# Patient Record
Sex: FEMALE | Race: WHITE | NOT HISPANIC OR LATINO | Employment: UNEMPLOYED | URBAN - METROPOLITAN AREA
[De-identification: names, ages, dates, MRNs, and addresses within clinical notes are randomized per-mention and may not be internally consistent; named-entity substitution may affect disease eponyms.]

---

## 2013-12-17 LAB — HM MAMMOGRAM: NORMAL

## 2015-02-04 LAB — HM PAP SMEAR: NEGATIVE

## 2017-01-03 DIAGNOSIS — F51.01 PRIMARY INSOMNIA: ICD-10-CM

## 2017-01-04 RX ORDER — ZOLPIDEM TARTRATE 10 MG/1
TABLET ORAL
Qty: 30 TABLET | Refills: 0 | OUTPATIENT
Start: 2017-01-04

## 2017-01-31 DIAGNOSIS — F51.01 PRIMARY INSOMNIA: ICD-10-CM

## 2017-01-31 RX ORDER — ZOLPIDEM TARTRATE 10 MG/1
TABLET ORAL
Qty: 15 TABLET | Refills: 0 | OUTPATIENT
Start: 2017-01-31 | End: 2017-02-27 | Stop reason: SDUPTHER

## 2017-02-19 DIAGNOSIS — F51.01 PRIMARY INSOMNIA: ICD-10-CM

## 2017-02-20 RX ORDER — ZOLPIDEM TARTRATE 10 MG/1
TABLET ORAL
Qty: 15 TABLET | Refills: 0 | OUTPATIENT
Start: 2017-02-20

## 2017-02-23 DIAGNOSIS — F51.01 PRIMARY INSOMNIA: ICD-10-CM

## 2017-02-23 RX ORDER — ZOLPIDEM TARTRATE 10 MG/1
TABLET ORAL
Qty: 15 TABLET | Refills: 0 | OUTPATIENT
Start: 2017-02-23

## 2017-02-27 DIAGNOSIS — F51.01 PRIMARY INSOMNIA: ICD-10-CM

## 2017-02-27 RX ORDER — ZOLPIDEM TARTRATE 10 MG/1
10 TABLET ORAL NIGHTLY PRN
Qty: 7 TABLET | Refills: 0 | OUTPATIENT
Start: 2017-02-27 | End: 2017-05-02 | Stop reason: SDUPTHER

## 2017-02-27 NOTE — TELEPHONE ENCOUNTER
Pt called wanting to know if we can refill her sleeping medication until her appointment? Medardo:03/09/17    Last seen 5/2016  Last refill 1/31/17 #15

## 2017-03-06 ENCOUNTER — OFFICE VISIT (OUTPATIENT)
Dept: OBSTETRICS AND GYNECOLOGY | Facility: CLINIC | Age: 47
End: 2017-03-06

## 2017-03-06 VITALS
WEIGHT: 140 LBS | BODY MASS INDEX: 25.76 KG/M2 | HEIGHT: 62 IN | DIASTOLIC BLOOD PRESSURE: 62 MMHG | SYSTOLIC BLOOD PRESSURE: 110 MMHG

## 2017-03-06 DIAGNOSIS — Z13.9 SCREENING: ICD-10-CM

## 2017-03-06 DIAGNOSIS — Z12.4 SCREENING FOR MALIGNANT NEOPLASM OF CERVIX: ICD-10-CM

## 2017-03-06 DIAGNOSIS — Z12.39 BREAST CANCER SCREENING: ICD-10-CM

## 2017-03-06 DIAGNOSIS — Z80.0 FAMILY HISTORY OF COLON CANCER REQUIRING SCREENING COLONOSCOPY: ICD-10-CM

## 2017-03-06 DIAGNOSIS — Z86.010 HISTORY OF COLON POLYPS: ICD-10-CM

## 2017-03-06 DIAGNOSIS — Z01.419 ENCOUNTER FOR GYNECOLOGICAL EXAMINATION WITHOUT ABNORMAL FINDING: Primary | ICD-10-CM

## 2017-03-06 LAB
BILIRUB BLD-MCNC: NEGATIVE MG/DL
CLARITY, POC: CLEAR
COLOR UR: YELLOW
GLUCOSE UR STRIP-MCNC: NEGATIVE MG/DL
KETONES UR QL: NEGATIVE
LEUKOCYTE EST, POC: NEGATIVE
NITRITE UR-MCNC: NEGATIVE MG/ML
PH UR: 6.5 [PH] (ref 5–8)
PROT UR STRIP-MCNC: NEGATIVE MG/DL
RBC # UR STRIP: NEGATIVE /UL
SP GR UR: 1.01 (ref 1–1.03)
UROBILINOGEN UR QL: NORMAL

## 2017-03-06 PROCEDURE — 81002 URINALYSIS NONAUTO W/O SCOPE: CPT | Performed by: OBSTETRICS & GYNECOLOGY

## 2017-03-06 PROCEDURE — 99396 PREV VISIT EST AGE 40-64: CPT | Performed by: OBSTETRICS & GYNECOLOGY

## 2017-03-06 NOTE — PROGRESS NOTES
"Subjective   Eimly Tierney is a 46 y.o. female who presents for annual - h/o colon polyps at colonoscopy 6 years ago - due for repeat - requesting 3-d tomosynthesis but advised insurance may not cover -  Reports  Periods are regular and last 5 days and are often heavy and painful for first 48 hours - does not require intervention at this time       History of Present Illness    The following portions of the patient's history were reviewed and updated as appropriate: allergies, current medications, past family history, past medical history, past social history, past surgical history and problem list.    Review of Systems   Constitutional: Negative for chills, fatigue and fever.   Gastrointestinal: Negative for abdominal distention, abdominal pain and blood in stool.   Genitourinary: Negative for dyspareunia, dysuria, menstrual problem, pelvic pain, vaginal bleeding, vaginal discharge and vaginal pain.   All other systems reviewed and are negative.    Visit Vitals   • /62   • Ht 62\" (157.5 cm)   • Wt 140 lb (63.5 kg)   • LMP 02/13/2017   • Breastfeeding No   • BMI 25.61 kg/m2       Objective   Physical Exam   Constitutional: She is oriented to person, place, and time. She appears well-developed and well-nourished.   Neck: Normal range of motion. Neck supple. No thyromegaly present.   Cardiovascular: Normal rate and regular rhythm.    Pulmonary/Chest: Effort normal and breath sounds normal. Right breast exhibits no mass, no nipple discharge, no skin change and no tenderness. Left breast exhibits no mass, no nipple discharge, no skin change and no tenderness.   Abdominal: Soft. Bowel sounds are normal. She exhibits no distension. There is no tenderness.   Genitourinary: Vagina normal and uterus normal. Pelvic exam was performed with patient supine. Uterus is not tender. Cervix exhibits no friability. Right adnexum displays no mass and no tenderness. Left adnexum displays no mass and no tenderness. No " vaginal discharge found.   Musculoskeletal: Normal range of motion. She exhibits no edema.   Neurological: She is alert and oriented to person, place, and time.   Skin: Skin is warm and dry. No rash noted.   Psychiatric: She has a normal mood and affect. Her behavior is normal.   Nursing note and vitals reviewed.        Assessment/Plan   Emily was seen today for establish care, gynecologic exam and menstrual problem.    Diagnoses and all orders for this visit:    Encounter for gynecological examination without abnormal finding    Screening  -     POC Urinalysis Dipstick    Screening for malignant neoplasm of cervix  -     IgP, Aptima HPV    Breast cancer screening  -     Cancel: Mammo screening digital tomosynthesis bilateral w CAD; Future  -     Mammo Screening Bilateral With CAD; Future    History of colon polyps  -     Ambulatory Referral For Screening Colonoscopy    Family history of colon cancer requiring screening colonoscopy  -     Ambulatory Referral For Screening Colonoscopy

## 2017-03-08 LAB
CYTOLOGIST CVX/VAG CYTO: NORMAL
CYTOLOGY CVX/VAG DOC THIN PREP: NORMAL
DX ICD CODE: NORMAL
HIV 1 & 2 AB SER-IMP: NORMAL
HPV I/H RISK 4 DNA CVX QL PROBE+SIG AMP: NEGATIVE
OTHER STN SPEC: NORMAL
PATH REPORT.FINAL DX SPEC: NORMAL
STAT OF ADQ CVX/VAG CYTO-IMP: NORMAL

## 2017-03-09 ENCOUNTER — OFFICE VISIT (OUTPATIENT)
Dept: FAMILY MEDICINE CLINIC | Facility: CLINIC | Age: 47
End: 2017-03-09

## 2017-03-09 ENCOUNTER — TELEPHONE (OUTPATIENT)
Dept: OBSTETRICS AND GYNECOLOGY | Facility: CLINIC | Age: 47
End: 2017-03-09

## 2017-03-09 VITALS
BODY MASS INDEX: 25.58 KG/M2 | WEIGHT: 139 LBS | TEMPERATURE: 98.3 F | SYSTOLIC BLOOD PRESSURE: 105 MMHG | HEIGHT: 62 IN | DIASTOLIC BLOOD PRESSURE: 60 MMHG | OXYGEN SATURATION: 99 % | HEART RATE: 73 BPM

## 2017-03-09 DIAGNOSIS — Z00.00 ANNUAL PHYSICAL EXAM: Primary | ICD-10-CM

## 2017-03-09 DIAGNOSIS — I10 BENIGN ESSENTIAL HYPERTENSION: ICD-10-CM

## 2017-03-09 LAB
ALBUMIN SERPL-MCNC: 4.5 G/DL (ref 3.5–5.2)
ALBUMIN/GLOB SERPL: 1.9 G/DL
ALP SERPL-CCNC: 34 U/L (ref 39–117)
ALT SERPL-CCNC: 14 U/L (ref 1–33)
APPEARANCE UR: CLEAR
AST SERPL-CCNC: 16 U/L (ref 1–32)
BASOPHILS # BLD AUTO: 0.02 10*3/MM3 (ref 0–0.2)
BASOPHILS NFR BLD AUTO: 0.4 % (ref 0–1.5)
BILIRUB SERPL-MCNC: 0.3 MG/DL (ref 0.1–1.2)
BILIRUB UR QL STRIP: NEGATIVE
BUN SERPL-MCNC: 13 MG/DL (ref 6–20)
BUN/CREAT SERPL: 16.9 (ref 7–25)
CALCIUM SERPL-MCNC: 9.3 MG/DL (ref 8.6–10.5)
CHLORIDE SERPL-SCNC: 99 MMOL/L (ref 98–107)
CHOLEST SERPL-MCNC: 231 MG/DL (ref 0–200)
CO2 SERPL-SCNC: 24.5 MMOL/L (ref 22–29)
COLOR UR: YELLOW
CREAT SERPL-MCNC: 0.77 MG/DL (ref 0.57–1)
EOSINOPHIL # BLD AUTO: 0.11 10*3/MM3 (ref 0–0.7)
EOSINOPHIL NFR BLD AUTO: 2.3 % (ref 0.3–6.2)
ERYTHROCYTE [DISTWIDTH] IN BLOOD BY AUTOMATED COUNT: 13 % (ref 11.7–13)
GLOBULIN SER CALC-MCNC: 2.4 GM/DL
GLUCOSE SERPL-MCNC: 86 MG/DL (ref 65–99)
GLUCOSE UR QL: NEGATIVE
HCT VFR BLD AUTO: 39 % (ref 35.6–45.5)
HDLC SERPL-MCNC: 91 MG/DL (ref 40–60)
HGB BLD-MCNC: 12.6 G/DL (ref 11.9–15.5)
HGB UR QL STRIP: NEGATIVE
IMM GRANULOCYTES # BLD: 0 10*3/MM3 (ref 0–0.03)
IMM GRANULOCYTES NFR BLD: 0 % (ref 0–0.5)
KETONES UR QL STRIP: NEGATIVE
LDLC SERPL CALC-MCNC: 117 MG/DL (ref 0–100)
LEUKOCYTE ESTERASE UR QL STRIP: NEGATIVE
LYMPHOCYTES # BLD AUTO: 1.65 10*3/MM3 (ref 0.9–4.8)
LYMPHOCYTES NFR BLD AUTO: 34 % (ref 19.6–45.3)
MCH RBC QN AUTO: 32.3 PG (ref 26.9–32)
MCHC RBC AUTO-ENTMCNC: 32.3 G/DL (ref 32.4–36.3)
MCV RBC AUTO: 100 FL (ref 80.5–98.2)
MONOCYTES # BLD AUTO: 0.4 10*3/MM3 (ref 0.2–1.2)
MONOCYTES NFR BLD AUTO: 8.2 % (ref 5–12)
NEUTROPHILS # BLD AUTO: 2.67 10*3/MM3 (ref 1.9–8.1)
NEUTROPHILS NFR BLD AUTO: 55.1 % (ref 42.7–76)
NITRITE UR QL STRIP: NEGATIVE
PH UR STRIP: 7 [PH] (ref 5–8)
PLATELET # BLD AUTO: 251 10*3/MM3 (ref 140–500)
POTASSIUM SERPL-SCNC: 4.5 MMOL/L (ref 3.5–5.2)
PROT SERPL-MCNC: 6.9 G/DL (ref 6–8.5)
PROT UR QL STRIP: NEGATIVE
RBC # BLD AUTO: 3.9 10*6/MM3 (ref 3.9–5.2)
SODIUM SERPL-SCNC: 137 MMOL/L (ref 136–145)
SP GR UR: 1.02 (ref 1–1.03)
TRIGL SERPL-MCNC: 113 MG/DL (ref 0–150)
UROBILINOGEN UR STRIP-MCNC: NORMAL MG/DL
VLDLC SERPL CALC-MCNC: 22.6 MG/DL (ref 5–40)
WBC # BLD AUTO: 4.85 10*3/MM3 (ref 4.5–10.7)

## 2017-03-09 PROCEDURE — 99396 PREV VISIT EST AGE 40-64: CPT | Performed by: FAMILY MEDICINE

## 2017-03-09 RX ORDER — LOSARTAN POTASSIUM 50 MG/1
50 TABLET ORAL DAILY
Qty: 30 TABLET | Refills: 11 | Status: SHIPPED | OUTPATIENT
Start: 2017-03-09 | End: 2018-04-18 | Stop reason: SDUPTHER

## 2017-03-09 NOTE — PROGRESS NOTES
Subjective   Emily Tierney is a 46 y.o. female. CC: annual physical    History of Present Illness   Emily is a 46 year old female who comes in today for her annual physical.  She saw her gynecologist on Monday and had a pap smear done.  They are scheduling her for a colonoscopy and also for a mammogram.  She has been doing well with no concerns or problems.  The Ambien continues to work well to help her sleep.      The following portions of the patient's history were reviewed and updated as appropriate: allergies, current medications, past family history, past medical history, past social history, past surgical history and problem list.    Review of Systems   Constitutional: Negative.  Negative for fatigue and unexpected weight change.   HENT: Negative.  Negative for congestion.    Respiratory: Negative.  Negative for cough, shortness of breath and wheezing.    Cardiovascular: Negative.  Negative for chest pain, palpitations and leg swelling.   Gastrointestinal: Negative.  Negative for abdominal pain, blood in stool, constipation, diarrhea, nausea and vomiting.   Genitourinary: Negative.  Negative for difficulty urinating, frequency and menstrual problem.   Musculoskeletal: Negative.  Negative for arthralgias and back pain.   Skin: Negative.  Negative for rash.   Neurological: Negative.  Negative for dizziness, light-headedness and headaches.   Psychiatric/Behavioral: Positive for sleep disturbance. Negative for dysphoric mood. The patient is not nervous/anxious.        Objective   Physical Exam   Constitutional: She is oriented to person, place, and time. She appears well-developed and well-nourished.   HENT:   Head: Normocephalic and atraumatic.   Right Ear: Hearing, tympanic membrane, external ear and ear canal normal.   Left Ear: Hearing, tympanic membrane, external ear and ear canal normal.   Nose: Nose normal.   Mouth/Throat: Uvula is midline, oropharynx is clear and moist and mucous membranes are  normal. No oropharyngeal exudate.   Eyes: Conjunctivae are normal. Pupils are equal, round, and reactive to light.   Neck: Normal range of motion. Neck supple. No JVD present. No thyromegaly present.   Cardiovascular: Normal rate, regular rhythm and normal heart sounds.    No murmur heard.  Pulmonary/Chest: Effort normal and breath sounds normal. No respiratory distress.   Abdominal: Soft. She exhibits no distension and no mass. There is no hepatosplenomegaly. There is no tenderness. No hernia.   Lymphadenopathy:     She has no cervical adenopathy.   Neurological: She is alert and oriented to person, place, and time.   Skin: Skin is warm, dry and intact. No rash noted.   Psychiatric: She has a normal mood and affect. Her behavior is normal.   Nursing note and vitals reviewed.    Vitals:    03/09/17 0908   BP: 105/60   Pulse: 73   Temp: 98.3 °F (36.8 °C)   SpO2: 99%     Assessment/Plan   Problems Addressed this Visit     None      Visit Diagnoses     Annual physical exam    -  Primary    Relevant Orders    Comprehensive metabolic panel    Lipid panel    CBC and Differential    Urinalysis (clean catch)          SARA obtained today and is appropriate.    The patient has read and signed the [facility] [employer] Substance Contract.  I will continue to see patient for regular follow up appointments.  They are well controlled on their medication.  SARA is updated every 3 months. The patient is aware of the potential for addiction and dependence.    The patient has read and signed the [facility] [employer] Substance Contract.  I will continue to see patient for regular follow up appointments.  They are well controlled on their medication.  SARA is updated every 3 months. The patient is aware of the potential for addiction. The patient has read and signed the [facility] [employer] Substance Contract.  I will continue to see patient for regular follow up appointments.  They are well controlled on their  medication.  SARA is updated every 3 months. The patient is aware of the potential for addiction and dependence.on and dependence.

## 2017-03-09 NOTE — TELEPHONE ENCOUNTER
----- Message from aMddi Coulter MD sent at 3/8/2017  4:59 PM EST -----  Please call patient and notify of normal results of pap smear

## 2017-03-21 ENCOUNTER — TRANSCRIBE ORDERS (OUTPATIENT)
Dept: GASTROENTEROLOGY | Facility: CLINIC | Age: 47
End: 2017-03-21

## 2017-03-21 DIAGNOSIS — Z80.0 FAMILY HISTORY OF GI MALIGNANCY: ICD-10-CM

## 2017-03-21 DIAGNOSIS — Z86.010 HX OF COLONIC POLYPS: Primary | ICD-10-CM

## 2017-03-23 ENCOUNTER — APPOINTMENT (OUTPATIENT)
Dept: WOMENS IMAGING | Facility: HOSPITAL | Age: 47
End: 2017-03-23

## 2017-03-23 PROCEDURE — 77063 BREAST TOMOSYNTHESIS BI: CPT | Performed by: RADIOLOGY

## 2017-03-23 PROCEDURE — G0202 SCR MAMMO BI INCL CAD: HCPCS | Performed by: RADIOLOGY

## 2017-03-23 PROCEDURE — 77067 SCR MAMMO BI INCL CAD: CPT | Performed by: RADIOLOGY

## 2017-03-30 ENCOUNTER — TELEPHONE (OUTPATIENT)
Dept: OBSTETRICS AND GYNECOLOGY | Age: 47
End: 2017-03-30

## 2017-03-30 NOTE — TELEPHONE ENCOUNTER
Called and ileana pt. Informed her of normal mammo results. Pt states she received a letter. Pt also states understanding to everything discussed.

## 2017-05-02 DIAGNOSIS — F51.01 PRIMARY INSOMNIA: ICD-10-CM

## 2017-05-02 RX ORDER — ZOLPIDEM TARTRATE 10 MG/1
TABLET ORAL
Qty: 30 TABLET | Refills: 0 | OUTPATIENT
Start: 2017-05-02 | End: 2017-05-31 | Stop reason: SDUPTHER

## 2017-05-16 RX ORDER — AMOXICILLIN 500 MG/1
1000 CAPSULE ORAL 3 TIMES DAILY
COMMUNITY
End: 2017-09-19

## 2017-05-17 ENCOUNTER — ANESTHESIA EVENT (OUTPATIENT)
Dept: GASTROENTEROLOGY | Facility: HOSPITAL | Age: 47
End: 2017-05-17

## 2017-05-17 ENCOUNTER — HOSPITAL ENCOUNTER (OUTPATIENT)
Facility: HOSPITAL | Age: 47
Setting detail: HOSPITAL OUTPATIENT SURGERY
Discharge: HOME | End: 2017-05-17
Attending: INTERNAL MEDICINE | Admitting: INTERNAL MEDICINE

## 2017-05-17 ENCOUNTER — ANESTHESIA (OUTPATIENT)
Dept: GASTROENTEROLOGY | Facility: HOSPITAL | Age: 47
End: 2017-05-17

## 2017-05-17 VITALS
TEMPERATURE: 97.9 F | SYSTOLIC BLOOD PRESSURE: 103 MMHG | HEIGHT: 62 IN | HEART RATE: 78 BPM | DIASTOLIC BLOOD PRESSURE: 75 MMHG | BODY MASS INDEX: 24.56 KG/M2 | OXYGEN SATURATION: 100 % | RESPIRATION RATE: 15 BRPM | WEIGHT: 133.5 LBS

## 2017-05-17 DIAGNOSIS — Z80.0 FAMILY HISTORY OF GI MALIGNANCY: ICD-10-CM

## 2017-05-17 DIAGNOSIS — Z86.010 HX OF COLONIC POLYPS: ICD-10-CM

## 2017-05-17 LAB
B-HCG UR QL: NEGATIVE
INTERNAL NEGATIVE CONTROL: NEGATIVE
INTERNAL POSITIVE CONTROL: POSITIVE
Lab: NORMAL

## 2017-05-17 PROCEDURE — 88305 TISSUE EXAM BY PATHOLOGIST: CPT | Performed by: INTERNAL MEDICINE

## 2017-05-17 PROCEDURE — S0260 H&P FOR SURGERY: HCPCS | Performed by: INTERNAL MEDICINE

## 2017-05-17 PROCEDURE — 25010000002 PROPOFOL 10 MG/ML EMULSION: Performed by: ANESTHESIOLOGY

## 2017-05-17 PROCEDURE — 45380 COLONOSCOPY AND BIOPSY: CPT | Performed by: INTERNAL MEDICINE

## 2017-05-17 RX ORDER — LIDOCAINE HYDROCHLORIDE 20 MG/ML
INJECTION, SOLUTION INFILTRATION; PERINEURAL AS NEEDED
Status: DISCONTINUED | OUTPATIENT
Start: 2017-05-17 | End: 2017-05-17 | Stop reason: SURG

## 2017-05-17 RX ORDER — SODIUM CHLORIDE, SODIUM LACTATE, POTASSIUM CHLORIDE, CALCIUM CHLORIDE 600; 310; 30; 20 MG/100ML; MG/100ML; MG/100ML; MG/100ML
30 INJECTION, SOLUTION INTRAVENOUS CONTINUOUS PRN
Status: DISCONTINUED | OUTPATIENT
Start: 2017-05-17 | End: 2017-05-17 | Stop reason: HOSPADM

## 2017-05-17 RX ORDER — PROPOFOL 10 MG/ML
VIAL (ML) INTRAVENOUS AS NEEDED
Status: DISCONTINUED | OUTPATIENT
Start: 2017-05-17 | End: 2017-05-17 | Stop reason: SURG

## 2017-05-17 RX ADMIN — PROPOFOL 160 MG: 10 INJECTION, EMULSION INTRAVENOUS at 13:51

## 2017-05-17 RX ADMIN — PROPOFOL 30 MG: 10 INJECTION, EMULSION INTRAVENOUS at 14:11

## 2017-05-17 RX ADMIN — PROPOFOL 40 MG: 10 INJECTION, EMULSION INTRAVENOUS at 14:01

## 2017-05-17 RX ADMIN — PROPOFOL 50 MG: 10 INJECTION, EMULSION INTRAVENOUS at 14:09

## 2017-05-17 RX ADMIN — PROPOFOL 70 MG: 10 INJECTION, EMULSION INTRAVENOUS at 13:55

## 2017-05-17 RX ADMIN — LIDOCAINE HYDROCHLORIDE 30 MG: 20 INJECTION, SOLUTION INFILTRATION; PERINEURAL at 13:51

## 2017-05-17 RX ADMIN — PROPOFOL 60 MG: 10 INJECTION, EMULSION INTRAVENOUS at 14:06

## 2017-05-17 RX ADMIN — SODIUM CHLORIDE, POTASSIUM CHLORIDE, SODIUM LACTATE AND CALCIUM CHLORIDE 30 ML/HR: 600; 310; 30; 20 INJECTION, SOLUTION INTRAVENOUS at 13:01

## 2017-05-18 LAB
CYTO UR: NORMAL
LAB AP CASE REPORT: NORMAL
Lab: NORMAL
PATH REPORT.FINAL DX SPEC: NORMAL
PATH REPORT.GROSS SPEC: NORMAL

## 2017-05-25 ENCOUNTER — TELEPHONE (OUTPATIENT)
Dept: GASTROENTEROLOGY | Facility: CLINIC | Age: 47
End: 2017-05-25

## 2017-05-30 DIAGNOSIS — J30.9 ATOPIC RHINITIS: ICD-10-CM

## 2017-05-30 RX ORDER — FLUTICASONE PROPIONATE 50 MCG
SPRAY, SUSPENSION (ML) NASAL
Qty: 16 G | Refills: 10 | Status: SHIPPED | OUTPATIENT
Start: 2017-05-30 | End: 2018-06-25 | Stop reason: SDUPTHER

## 2017-05-31 DIAGNOSIS — F51.01 PRIMARY INSOMNIA: ICD-10-CM

## 2017-05-31 RX ORDER — ZOLPIDEM TARTRATE 10 MG/1
TABLET ORAL
Qty: 30 TABLET | Refills: 0 | Status: SHIPPED | OUTPATIENT
Start: 2017-05-31 | End: 2017-06-28 | Stop reason: SDUPTHER

## 2017-06-28 DIAGNOSIS — F51.01 PRIMARY INSOMNIA: ICD-10-CM

## 2017-06-28 RX ORDER — ZOLPIDEM TARTRATE 10 MG/1
10 TABLET ORAL NIGHTLY PRN
Qty: 30 TABLET | Refills: 0 | OUTPATIENT
Start: 2017-06-28 | End: 2017-07-31 | Stop reason: SDUPTHER

## 2017-07-31 DIAGNOSIS — F51.01 PRIMARY INSOMNIA: ICD-10-CM

## 2017-07-31 RX ORDER — ZOLPIDEM TARTRATE 10 MG/1
TABLET ORAL
Qty: 30 TABLET | Refills: 0 | OUTPATIENT
Start: 2017-07-31 | End: 2017-08-31 | Stop reason: SDUPTHER

## 2017-08-31 DIAGNOSIS — F51.01 PRIMARY INSOMNIA: ICD-10-CM

## 2017-08-31 RX ORDER — ZOLPIDEM TARTRATE 10 MG/1
TABLET ORAL
Qty: 30 TABLET | Refills: 0 | OUTPATIENT
Start: 2017-08-31 | End: 2017-09-19 | Stop reason: SDUPTHER

## 2017-09-18 ENCOUNTER — TELEPHONE (OUTPATIENT)
Dept: FAMILY MEDICINE CLINIC | Facility: CLINIC | Age: 47
End: 2017-09-18

## 2017-09-18 NOTE — TELEPHONE ENCOUNTER
Can work her in at 11:15.  Do not need full 30 minutes for the patient in the 11:00.  Please tell her that she is being worked in and may have to wait a little bit

## 2017-09-19 ENCOUNTER — OFFICE VISIT (OUTPATIENT)
Dept: FAMILY MEDICINE CLINIC | Facility: CLINIC | Age: 47
End: 2017-09-19

## 2017-09-19 VITALS
DIASTOLIC BLOOD PRESSURE: 60 MMHG | HEIGHT: 62 IN | HEART RATE: 78 BPM | SYSTOLIC BLOOD PRESSURE: 120 MMHG | BODY MASS INDEX: 24.93 KG/M2 | OXYGEN SATURATION: 99 % | TEMPERATURE: 98 F | WEIGHT: 135.5 LBS

## 2017-09-19 DIAGNOSIS — J40 BRONCHITIS: Primary | ICD-10-CM

## 2017-09-19 DIAGNOSIS — B37.31 YEAST VAGINITIS: ICD-10-CM

## 2017-09-19 DIAGNOSIS — F51.01 PRIMARY INSOMNIA: ICD-10-CM

## 2017-09-19 PROCEDURE — 99213 OFFICE O/P EST LOW 20 MIN: CPT | Performed by: FAMILY MEDICINE

## 2017-09-19 RX ORDER — BENZONATATE 200 MG/1
200 CAPSULE ORAL 3 TIMES DAILY PRN
Qty: 30 CAPSULE | Refills: 0 | Status: SHIPPED | OUTPATIENT
Start: 2017-09-19 | End: 2018-03-07

## 2017-09-19 RX ORDER — ZOLPIDEM TARTRATE 10 MG/1
10 TABLET ORAL NIGHTLY PRN
Qty: 30 TABLET | Refills: 0 | Status: SHIPPED | OUTPATIENT
Start: 2017-09-19 | End: 2017-10-16 | Stop reason: SDUPTHER

## 2017-09-19 RX ORDER — AZITHROMYCIN 250 MG/1
TABLET, FILM COATED ORAL
Qty: 6 TABLET | Refills: 0 | Status: SHIPPED | OUTPATIENT
Start: 2017-09-19 | End: 2018-03-07

## 2017-09-19 RX ORDER — FLUCONAZOLE 150 MG/1
150 TABLET ORAL ONCE
Qty: 1 TABLET | Refills: 0 | Status: SHIPPED | OUTPATIENT
Start: 2017-09-19 | End: 2017-09-19

## 2017-09-19 NOTE — PROGRESS NOTES
Subjective   Emily Tierney is a 46 y.o. female. CC: URI    History of Present Illness   Emily is a 46 year old male who comes in today with the complaint of an upper respiratory infection.  This started about a week ago.  She feels that it is mostly in her chest.  Complains of chest congestion and cough.  The cough is occasionally productive.  Also has sinus congestion and drainage.  No known fever.  Continues to take Ambien prn sleep and it does work well when she takes it.  Needs a new prescription for it.  Requests script for Diflucan since she often gets yeast infections when she takes antibiotics.          The following portions of the patient's history were reviewed and updated as appropriate: allergies, current medications, past medical history, past social history, past surgical history and problem list.    Review of Systems   Constitutional: Negative.  Negative for diaphoresis, fatigue and fever.   HENT: Positive for congestion.    Respiratory: Positive for cough. Negative for shortness of breath and wheezing.    Cardiovascular: Negative.  Negative for chest pain, palpitations and leg swelling.   Gastrointestinal: Negative.  Negative for abdominal pain, blood in stool, constipation, diarrhea and nausea.   Genitourinary: Negative.  Negative for difficulty urinating and hematuria.   Musculoskeletal: Negative.  Negative for arthralgias and back pain.   Skin: Negative.  Negative for rash.   Neurological: Negative.  Negative for dizziness, light-headedness and headaches.   Psychiatric/Behavioral: Positive for sleep disturbance. Negative for dysphoric mood. The patient is not nervous/anxious.        Objective   Physical Exam   Constitutional: She is oriented to person, place, and time. She appears well-developed and well-nourished.   HENT:   Head: Normocephalic and atraumatic.   Right Ear: Hearing, tympanic membrane, external ear and ear canal normal.   Left Ear: Hearing, tympanic membrane, external  ear and ear canal normal.   Nose: Nose normal.   Mouth/Throat: Uvula is midline, oropharynx is clear and moist and mucous membranes are normal. No oropharyngeal exudate.   Neck: Normal range of motion. Neck supple. No thyromegaly present.   Cardiovascular: Normal rate, regular rhythm and normal heart sounds.    Pulmonary/Chest: Effort normal and breath sounds normal.   Lymphadenopathy:     She has no cervical adenopathy.   Neurological: She is alert and oriented to person, place, and time.   Skin: Skin is warm and dry. No rash noted.   Psychiatric: She has a normal mood and affect. Her behavior is normal.   Nursing note and vitals reviewed.    Vitals:    09/19/17 1113   BP: 120/60   Pulse: 78   Temp: 98 °F (36.7 °C)   SpO2: 99%     Assessment/Plan   Problems Addressed this Visit        Other    Primary insomnia    Relevant Medications    zolpidem (AMBIEN) 10 MG tablet      Other Visit Diagnoses     Bronchitis    -  Primary    Relevant Medications    azithromycin (ZITHROMAX Z-MARA) 250 MG tablet    benzonatate (TESSALON) 200 MG capsule    Yeast vaginitis        Relevant Medications    fluconazole (DIFLUCAN) 150 MG tablet        The patient has read and signed the [facility] Substance Contract.  I will continue to see patient for regular follow up appointments.  They are well controlled on their medication.  SARA is updated every 3 months. The patient is aware of the potential for addiction and dependence.    The patient has read and signed the [facility] Substance Contract.  I will continue to see patient for regular follow up appointments.  They are well controlled on their medication.  SARA is updated every 3 months. The patient is aware of the potential for addiction. The patient has read and signed the [facility] Substance Contract.  I will continue to see patient for regular follow up appointments.  They are well controlled on their medication.  SARA is updated  every 3 months. The patient is aware of the potential for addiction and dependence.on and dependence.

## 2017-10-16 DIAGNOSIS — F51.01 PRIMARY INSOMNIA: ICD-10-CM

## 2017-10-16 RX ORDER — ZOLPIDEM TARTRATE 10 MG/1
TABLET ORAL
Qty: 30 TABLET | Refills: 0 | OUTPATIENT
Start: 2017-10-16 | End: 2017-11-14 | Stop reason: SDUPTHER

## 2017-11-07 DIAGNOSIS — F51.01 PRIMARY INSOMNIA: ICD-10-CM

## 2017-11-07 RX ORDER — ZOLPIDEM TARTRATE 10 MG/1
TABLET ORAL
Qty: 30 TABLET | Refills: 0 | OUTPATIENT
Start: 2017-11-07

## 2017-11-14 DIAGNOSIS — F51.01 PRIMARY INSOMNIA: ICD-10-CM

## 2017-11-14 RX ORDER — ZOLPIDEM TARTRATE 10 MG/1
TABLET ORAL
Qty: 30 TABLET | Refills: 0 | OUTPATIENT
Start: 2017-11-14 | End: 2017-12-11 | Stop reason: SDUPTHER

## 2017-12-11 DIAGNOSIS — F51.01 PRIMARY INSOMNIA: ICD-10-CM

## 2017-12-11 RX ORDER — ZOLPIDEM TARTRATE 10 MG/1
TABLET ORAL
Qty: 30 TABLET | Refills: 0 | OUTPATIENT
Start: 2017-12-11 | End: 2018-03-07

## 2018-03-07 ENCOUNTER — OFFICE VISIT (OUTPATIENT)
Dept: OBSTETRICS AND GYNECOLOGY | Age: 48
End: 2018-03-07

## 2018-03-07 VITALS
SYSTOLIC BLOOD PRESSURE: 110 MMHG | HEIGHT: 62 IN | DIASTOLIC BLOOD PRESSURE: 60 MMHG | WEIGHT: 137 LBS | BODY MASS INDEX: 25.21 KG/M2

## 2018-03-07 DIAGNOSIS — N93.9 ABNORMAL UTERINE BLEEDING (AUB): ICD-10-CM

## 2018-03-07 DIAGNOSIS — Z01.419 ENCOUNTER FOR GYNECOLOGICAL EXAMINATION WITHOUT ABNORMAL FINDING: Primary | ICD-10-CM

## 2018-03-07 DIAGNOSIS — N94.6 DYSMENORRHEA: ICD-10-CM

## 2018-03-07 DIAGNOSIS — Z12.4 SCREENING FOR MALIGNANT NEOPLASM OF CERVIX: ICD-10-CM

## 2018-03-07 DIAGNOSIS — Z13.89 SCREENING FOR BLOOD OR PROTEIN IN URINE: ICD-10-CM

## 2018-03-07 DIAGNOSIS — Z12.31 ENCOUNTER FOR SCREENING MAMMOGRAM FOR BREAST CANCER: ICD-10-CM

## 2018-03-07 LAB
BILIRUB BLD-MCNC: NEGATIVE MG/DL
CLARITY, POC: CLEAR
COLOR UR: YELLOW
GLUCOSE UR STRIP-MCNC: NEGATIVE MG/DL
KETONES UR QL: NEGATIVE
LEUKOCYTE EST, POC: NEGATIVE
NITRITE UR-MCNC: NEGATIVE MG/ML
PH UR: 7.5 [PH] (ref 5–8)
PROT UR STRIP-MCNC: NEGATIVE MG/DL
RBC # UR STRIP: NEGATIVE /UL
SP GR UR: 1.01 (ref 1–1.03)
UROBILINOGEN UR QL: NORMAL

## 2018-03-07 PROCEDURE — 99396 PREV VISIT EST AGE 40-64: CPT | Performed by: OBSTETRICS & GYNECOLOGY

## 2018-03-07 NOTE — PROGRESS NOTES
"Subjective   Emily Tierney is a 47 y.o. female Annual.  Last pap 3/6/17, negative. Last mammo 3/23/17, benign.   had vasectomy. Still reports painful cramps and heavy periods x 5 days - does not want ablation - will try lo-loestrin for 2 months  - desires 3-D MGM due to fibrocystic breast   History of Present Illness    The following portions of the patient's history were reviewed and updated as appropriate: allergies, current medications, past family history, past medical history, past social history, past surgical history and problem list.    Review of Systems   Constitutional: Negative for chills and fever.   Gastrointestinal: Negative for abdominal distention and abdominal pain.   Genitourinary: Positive for menstrual problem and pelvic pain. Negative for dyspareunia, dysuria, vaginal bleeding, vaginal discharge and vaginal pain.   All other systems reviewed and are negative.  /60  Ht 157.5 cm (62\")  Wt 62.1 kg (137 lb)  LMP 02/14/2018 (Exact Date)  Breastfeeding? No  BMI 25.06 kg/m2      Objective   Physical Exam   Constitutional: She is oriented to person, place, and time. She appears well-developed and well-nourished.   Neck: Normal range of motion. Neck supple. No thyromegaly present.   Cardiovascular: Normal rate and regular rhythm.    Pulmonary/Chest: Effort normal and breath sounds normal. Right breast exhibits no mass, no nipple discharge, no skin change and no tenderness. Left breast exhibits no mass, no nipple discharge, no skin change and no tenderness.   Abdominal: Soft. Bowel sounds are normal. She exhibits no distension. There is no tenderness.   Genitourinary: Vagina normal and uterus normal. Pelvic exam was performed with patient supine. Uterus is not tender. Cervix exhibits no friability. Right adnexum displays no mass and no tenderness. Left adnexum displays no mass and no tenderness. No vaginal discharge found.   Musculoskeletal: Normal range of motion. She exhibits no " edema.   Neurological: She is alert and oriented to person, place, and time.   Skin: Skin is warm and dry. No rash noted.   Psychiatric: She has a normal mood and affect. Her behavior is normal.   Nursing note and vitals reviewed.        Assessment/Plan   Emily was seen today for gynecologic exam.    Diagnoses and all orders for this visit:    Encounter for gynecological examination without abnormal finding    Screening for blood or protein in urine  -     POC Urinalysis Dipstick    Screening for malignant neoplasm of cervix  -     IgP, Aptima HPV - ThinPrep Vial, Cervix    Encounter for screening mammogram for breast cancer  -     Mammo screening digital tomosynthesis bilateral w CAD; Future    Abnormal uterine bleeding (AUB)  -     Norethin-Eth Estrad-Fe Biphas (LO LOESTRIN FE) 1 MG-10 MCG / 10 MCG tablet; Take 1 tablet by mouth Daily.    Dysmenorrhea  -     Norethin-Eth Estrad-Fe Biphas (LO LOESTRIN FE) 1 MG-10 MCG / 10 MCG tablet; Take 1 tablet by mouth Daily.        Counseling was given to patient for the following topics: self-breast exams .

## 2018-03-09 ENCOUNTER — TELEPHONE (OUTPATIENT)
Dept: OBSTETRICS AND GYNECOLOGY | Age: 48
End: 2018-03-09

## 2018-03-09 NOTE — TELEPHONE ENCOUNTER
----- Message from Maddi Coulter MD sent at 3/9/2018  7:15 AM EST -----  Please call patient and notify of normal results of pap smear

## 2018-03-26 ENCOUNTER — APPOINTMENT (OUTPATIENT)
Dept: WOMENS IMAGING | Facility: HOSPITAL | Age: 48
End: 2018-03-26

## 2018-03-26 PROCEDURE — 77063 BREAST TOMOSYNTHESIS BI: CPT | Performed by: RADIOLOGY

## 2018-03-26 PROCEDURE — 77067 SCR MAMMO BI INCL CAD: CPT | Performed by: RADIOLOGY

## 2018-03-29 ENCOUNTER — TELEPHONE (OUTPATIENT)
Dept: OBSTETRICS AND GYNECOLOGY | Age: 48
End: 2018-03-29

## 2018-03-29 NOTE — TELEPHONE ENCOUNTER
----- Message from Maddi Coulter MD sent at 3/29/2018  8:50 AM EDT -----  Please call patient and notify of normal results of mammogram - repeat in one year

## 2018-04-18 DIAGNOSIS — I10 BENIGN ESSENTIAL HYPERTENSION: ICD-10-CM

## 2018-04-18 RX ORDER — LOSARTAN POTASSIUM 50 MG/1
50 TABLET ORAL DAILY
Qty: 30 TABLET | Refills: 1 | Status: SHIPPED | OUTPATIENT
Start: 2018-04-18 | End: 2018-04-27 | Stop reason: SDUPTHER

## 2018-04-27 ENCOUNTER — OFFICE VISIT (OUTPATIENT)
Dept: FAMILY MEDICINE CLINIC | Facility: CLINIC | Age: 48
End: 2018-04-27

## 2018-04-27 VITALS
HEIGHT: 62 IN | BODY MASS INDEX: 24.84 KG/M2 | SYSTOLIC BLOOD PRESSURE: 122 MMHG | HEART RATE: 79 BPM | OXYGEN SATURATION: 99 % | DIASTOLIC BLOOD PRESSURE: 78 MMHG | WEIGHT: 135 LBS | RESPIRATION RATE: 18 BRPM

## 2018-04-27 DIAGNOSIS — I10 BENIGN ESSENTIAL HYPERTENSION: Primary | ICD-10-CM

## 2018-04-27 DIAGNOSIS — R53.83 FATIGUE, UNSPECIFIED TYPE: ICD-10-CM

## 2018-04-27 DIAGNOSIS — Z79.899 MEDICATION MANAGEMENT: ICD-10-CM

## 2018-04-27 PROCEDURE — 99213 OFFICE O/P EST LOW 20 MIN: CPT | Performed by: NURSE PRACTITIONER

## 2018-04-27 RX ORDER — LOSARTAN POTASSIUM 50 MG/1
50 TABLET ORAL DAILY
Qty: 90 TABLET | Refills: 2 | Status: SHIPPED | OUTPATIENT
Start: 2018-04-27 | End: 2019-04-10 | Stop reason: SDUPTHER

## 2018-04-27 NOTE — PROGRESS NOTES
Subjective   Emily Tierney is a 47 y.o. female presents for routine follow up for hypertension. Taking losartan as prescribed. Has noticed her BP will sometimes feel elevated in the afternoon. Drinks two cups of coffee daily. Drinks wine with dinner. Has trouble falling asleep. Taking melatonin and tylenol pm with some relief. Waking up tired in the morning.    Vitals:    04/27/18 1051   BP: 122/78   Pulse: 79   Resp: 18   SpO2: 99%        Hypertension   This is a chronic problem. The current episode started more than 1 year ago. The problem is unchanged. The problem is controlled. Pertinent negatives include no anxiety, blurred vision, chest pain, headaches, malaise/fatigue, neck pain, orthopnea, palpitations, peripheral edema, PND, shortness of breath or sweats. There are no associated agents to hypertension.        The following portions of the patient's history were reviewed and updated as appropriate: allergies, current medications, past family history, past medical history, past social history, past surgical history and problem list.    Review of Systems   Constitutional: Positive for fatigue. Negative for malaise/fatigue.   HENT: Negative.    Eyes: Negative.  Negative for blurred vision.   Respiratory: Negative.  Negative for shortness of breath.    Cardiovascular: Negative.  Negative for chest pain, palpitations, orthopnea and PND.   Gastrointestinal: Negative.    Endocrine: Negative.    Genitourinary: Negative.    Musculoskeletal: Negative.  Negative for neck pain.   Skin: Negative.    Allergic/Immunologic: Negative.    Neurological: Negative.  Negative for headaches.   Hematological: Negative.    Psychiatric/Behavioral: Positive for sleep disturbance.       Objective   Physical Exam   Constitutional: She is oriented to person, place, and time. She appears well-developed and well-nourished.   Neck: Neck supple. No thyromegaly present.   Cardiovascular: Normal rate, regular rhythm and normal heart  sounds.  Exam reveals no gallop and no friction rub.    No murmur heard.  Pulmonary/Chest: Effort normal and breath sounds normal. No respiratory distress. She has no wheezes. She has no rales.   Neurological: She is alert and oriented to person, place, and time.   Psychiatric: She has a normal mood and affect.   Vitals reviewed.      Assessment/Plan   Emily was seen today for hypertension and med refill.    Diagnoses and all orders for this visit:    Benign essential hypertension  -     losartan (COZAAR) 50 MG tablet; Take 1 tablet by mouth Daily.  -     Comprehensive metabolic panel    Medication management  -     Comprehensive metabolic panel    Fatigue, unspecified type  -     Thyroid Panel With TSH  -     Vitamin D 25 hydroxy      Patient to continue losartan, monitor bp at home and follow up with Dr. Bustamante for a physical in six months.   Declined trazodone for sleep management. Will follow up and discuss with PCP

## 2018-04-28 LAB
25(OH)D3+25(OH)D2 SERPL-MCNC: 24.2 NG/ML (ref 30–100)
ALBUMIN SERPL-MCNC: 5 G/DL (ref 3.5–5.2)
ALBUMIN/GLOB SERPL: 1.9 G/DL
ALP SERPL-CCNC: 51 U/L (ref 39–117)
ALT SERPL-CCNC: 15 U/L (ref 1–33)
AST SERPL-CCNC: 17 U/L (ref 1–32)
BILIRUB SERPL-MCNC: 0.3 MG/DL (ref 0.1–1.2)
BUN SERPL-MCNC: 12 MG/DL (ref 6–20)
BUN/CREAT SERPL: 17.1 (ref 7–25)
CALCIUM SERPL-MCNC: 10 MG/DL (ref 8.6–10.5)
CHLORIDE SERPL-SCNC: 99 MMOL/L (ref 98–107)
CO2 SERPL-SCNC: 25.4 MMOL/L (ref 22–29)
CREAT SERPL-MCNC: 0.7 MG/DL (ref 0.57–1)
FT4I SERPL CALC-MCNC: 1.5 (ref 1.2–4.9)
GFR SERPLBLD CREATININE-BSD FMLA CKD-EPI: 109 ML/MIN/1.73
GFR SERPLBLD CREATININE-BSD FMLA CKD-EPI: 90 ML/MIN/1.73
GLOBULIN SER CALC-MCNC: 2.6 GM/DL
GLUCOSE SERPL-MCNC: 105 MG/DL (ref 65–99)
POTASSIUM SERPL-SCNC: 4.1 MMOL/L (ref 3.5–5.2)
PROT SERPL-MCNC: 7.6 G/DL (ref 6–8.5)
SODIUM SERPL-SCNC: 138 MMOL/L (ref 136–145)
T3RU NFR SERPL: 24 % (ref 24–39)
T4 SERPL-MCNC: 6.3 UG/DL (ref 4.5–12)
TSH SERPL DL<=0.005 MIU/L-ACNC: 1.07 UIU/ML (ref 0.45–4.5)

## 2018-06-25 DIAGNOSIS — J30.1 ACUTE NONSEASONAL ALLERGIC RHINITIS DUE TO POLLEN: ICD-10-CM

## 2018-06-25 RX ORDER — FLUTICASONE PROPIONATE 50 MCG
2 SPRAY, SUSPENSION (ML) NASAL DAILY
Qty: 16 G | Refills: 2 | Status: SHIPPED | OUTPATIENT
Start: 2018-06-25

## 2019-01-24 ENCOUNTER — TELEPHONE (OUTPATIENT)
Dept: OBSTETRICS AND GYNECOLOGY | Age: 49
End: 2019-01-24

## 2019-01-24 DIAGNOSIS — Z12.31 SCREENING MAMMOGRAM, ENCOUNTER FOR: Primary | ICD-10-CM

## 2019-01-24 NOTE — TELEPHONE ENCOUNTER
Dr Coulter pt (aware Dr Coulter is out today) requesting an order subha be sent to [facility] for a 3D mammogram.

## 2019-03-29 ENCOUNTER — APPOINTMENT (OUTPATIENT)
Dept: WOMENS IMAGING | Facility: HOSPITAL | Age: 49
End: 2019-03-29

## 2019-03-29 PROCEDURE — 77067 SCR MAMMO BI INCL CAD: CPT | Performed by: RADIOLOGY

## 2019-03-29 PROCEDURE — 77063 BREAST TOMOSYNTHESIS BI: CPT | Performed by: RADIOLOGY

## 2019-04-01 ENCOUNTER — TELEPHONE (OUTPATIENT)
Dept: OBSTETRICS AND GYNECOLOGY | Age: 49
End: 2019-04-01

## 2019-04-10 DIAGNOSIS — I10 BENIGN ESSENTIAL HYPERTENSION: ICD-10-CM

## 2019-04-10 RX ORDER — LOSARTAN POTASSIUM 50 MG/1
50 TABLET ORAL DAILY
Qty: 90 TABLET | Refills: 0 | Status: SHIPPED | OUTPATIENT
Start: 2019-04-10 | End: 2019-07-23 | Stop reason: SDUPTHER

## 2019-04-24 ENCOUNTER — OFFICE VISIT (OUTPATIENT)
Dept: OBSTETRICS AND GYNECOLOGY | Age: 49
End: 2019-04-24

## 2019-04-24 VITALS
DIASTOLIC BLOOD PRESSURE: 80 MMHG | WEIGHT: 132 LBS | SYSTOLIC BLOOD PRESSURE: 125 MMHG | HEIGHT: 62 IN | BODY MASS INDEX: 24.29 KG/M2

## 2019-04-24 DIAGNOSIS — Z01.419 ENCOUNTER FOR GYNECOLOGICAL EXAMINATION WITHOUT ABNORMAL FINDING: Primary | ICD-10-CM

## 2019-04-24 DIAGNOSIS — N95.1 PERIMENOPAUSAL SYMPTOMS: ICD-10-CM

## 2019-04-24 DIAGNOSIS — N93.9 ABNORMAL UTERINE BLEEDING (AUB): ICD-10-CM

## 2019-04-24 DIAGNOSIS — Z13.89 SCREENING FOR BLOOD OR PROTEIN IN URINE: ICD-10-CM

## 2019-04-24 DIAGNOSIS — N94.6 DYSMENORRHEA: ICD-10-CM

## 2019-04-24 DIAGNOSIS — Z12.4 SCREENING FOR MALIGNANT NEOPLASM OF CERVIX: ICD-10-CM

## 2019-04-24 LAB
BILIRUB BLD-MCNC: NEGATIVE MG/DL
GLUCOSE UR STRIP-MCNC: NEGATIVE MG/DL
KETONES UR QL: NEGATIVE
LEUKOCYTE EST, POC: NEGATIVE
NITRITE UR-MCNC: NEGATIVE MG/ML
PH UR: 7 [PH] (ref 5–8)
PROT UR STRIP-MCNC: NEGATIVE MG/DL
RBC # UR STRIP: NEGATIVE /UL
SP GR UR: 1.02 (ref 1–1.03)
UROBILINOGEN UR QL: NORMAL

## 2019-04-24 PROCEDURE — 99396 PREV VISIT EST AGE 40-64: CPT | Performed by: OBSTETRICS & GYNECOLOGY

## 2019-04-24 RX ORDER — FLUOXETINE HYDROCHLORIDE 20 MG/1
20 CAPSULE ORAL DAILY
Qty: 90 CAPSULE | Refills: 3 | Status: SHIPPED | OUTPATIENT
Start: 2019-04-24 | End: 2020-02-25

## 2019-04-24 NOTE — PROGRESS NOTES
"Subjective   Emily Tierney is a 48 y.o. female annual visit , last pap 03/07/18 neg , mmg 03/29/19 @ [facility] neg , colonoscopy  05/17/17 dr sandra at [facility]  - 13-year old son in 6 th [facility] and doing well.   Patient states periods are still 5 days and heavy but most concerned about perimenopausal symptoms - extreme irritability and agitation day of starting period.  Discussed tx options - will do SSRI due to HTN.     History of Present Illness    The following portions of the patient's history were reviewed and updated as appropriate: allergies, current medications, past family history, past medical history, past social history, past surgical history and problem list.    Review of Systems   Constitutional: Negative for chills, fatigue and fever.   Gastrointestinal: Negative for abdominal distention and abdominal pain.   Genitourinary: Positive for menstrual problem. Negative for dyspareunia, dysuria, pelvic pain, vaginal bleeding, vaginal discharge and vaginal pain.   Psychiatric/Behavioral: Positive for agitation.   All other systems reviewed and are negative.  /80   Ht 157.5 cm (62\")   Wt 59.9 kg (132 lb)   LMP 04/03/2019   Breastfeeding? No   BMI 24.14 kg/m²       Objective   Physical Exam   Constitutional: She is oriented to person, place, and time. She appears well-developed and well-nourished.   Neck: Normal range of motion. Neck supple. No thyromegaly present.   Cardiovascular: Normal rate and regular rhythm.   Pulmonary/Chest: Effort normal and breath sounds normal. Right breast exhibits no mass, no nipple discharge, no skin change and no tenderness. Left breast exhibits no mass, no nipple discharge, no skin change and no tenderness.   Abdominal: Soft. Bowel sounds are normal. She exhibits no distension. There is no tenderness.   Genitourinary: Vagina normal and uterus normal. Pelvic exam was performed with patient supine. Uterus is not tender. Cervix exhibits no friability. Right adnexum " displays no mass and no tenderness. Left adnexum displays no mass and no tenderness. No vaginal discharge found.   Musculoskeletal: Normal range of motion. She exhibits no edema.   Neurological: She is alert and oriented to person, place, and time.   Skin: Skin is warm and dry. No rash noted.   Psychiatric: She has a normal mood and affect. Her behavior is normal.   Nursing note and vitals reviewed.        Assessment/Plan   Emily was seen today for gynecologic exam.    Diagnoses and all orders for this visit:    Encounter for gynecological examination without abnormal finding    Screening for blood or protein in urine  -     POC Urinalysis Dipstick    Screening for malignant neoplasm of cervix  -     IgP, Aptima HPV    Abnormal uterine bleeding (AUB)    Dysmenorrhea    Perimenopausal symptoms  -     FLUoxetine (PROZAC) 20 MG capsule; Take 1 capsule by mouth Daily.      Counseling was given to patient for the following topics: instructions for management, risks and benefits of treatment options and self-breast exams .   Return 8 weeks

## 2019-04-29 ENCOUNTER — TELEPHONE (OUTPATIENT)
Dept: OBSTETRICS AND GYNECOLOGY | Age: 49
End: 2019-04-29

## 2019-04-29 NOTE — TELEPHONE ENCOUNTER
----- Message from Maddi Coulter MD sent at 4/29/2019 12:46 PM EDT -----  Please call patient and notify of normal results of pap smear

## 2019-07-23 DIAGNOSIS — I10 BENIGN ESSENTIAL HYPERTENSION: ICD-10-CM

## 2019-07-23 RX ORDER — LOSARTAN POTASSIUM 50 MG/1
50 TABLET ORAL DAILY
Qty: 90 TABLET | Refills: 1 | Status: SHIPPED | OUTPATIENT
Start: 2019-07-23 | End: 2020-01-21

## 2020-01-21 DIAGNOSIS — I10 BENIGN ESSENTIAL HYPERTENSION: ICD-10-CM

## 2020-01-21 RX ORDER — LOSARTAN POTASSIUM 50 MG/1
50 TABLET ORAL DAILY
Qty: 30 TABLET | Refills: 0 | Status: SHIPPED | OUTPATIENT
Start: 2020-01-21 | End: 2020-03-03 | Stop reason: SDUPTHER

## 2020-02-25 ENCOUNTER — OFFICE VISIT (OUTPATIENT)
Dept: FAMILY MEDICINE CLINIC | Facility: CLINIC | Age: 50
End: 2020-02-25

## 2020-02-25 VITALS
OXYGEN SATURATION: 99 % | HEART RATE: 84 BPM | SYSTOLIC BLOOD PRESSURE: 118 MMHG | BODY MASS INDEX: 25.03 KG/M2 | HEIGHT: 62 IN | RESPIRATION RATE: 16 BRPM | DIASTOLIC BLOOD PRESSURE: 50 MMHG | WEIGHT: 136 LBS | TEMPERATURE: 98.3 F

## 2020-02-25 DIAGNOSIS — Z13.29 SCREENING FOR THYROID DISORDER: ICD-10-CM

## 2020-02-25 DIAGNOSIS — I10 ESSENTIAL HYPERTENSION: ICD-10-CM

## 2020-02-25 DIAGNOSIS — E55.9 VITAMIN D DEFICIENCY: ICD-10-CM

## 2020-02-25 DIAGNOSIS — Z00.00 ANNUAL PHYSICAL EXAM: Primary | ICD-10-CM

## 2020-02-25 DIAGNOSIS — Z13.220 SCREENING FOR LIPOID DISORDERS: ICD-10-CM

## 2020-02-25 DIAGNOSIS — H91.91 DECREASED HEARING, RIGHT: ICD-10-CM

## 2020-02-25 DIAGNOSIS — Z13.21 ENCOUNTER FOR VITAMIN DEFICIENCY SCREENING: ICD-10-CM

## 2020-02-25 DIAGNOSIS — Z83.438 FAMILY HISTORY OF MIXED HYPERLIPIDEMIA: ICD-10-CM

## 2020-02-25 PROCEDURE — 90471 IMMUNIZATION ADMIN: CPT | Performed by: NURSE PRACTITIONER

## 2020-02-25 PROCEDURE — 93000 ELECTROCARDIOGRAM COMPLETE: CPT | Performed by: NURSE PRACTITIONER

## 2020-02-25 PROCEDURE — 90715 TDAP VACCINE 7 YRS/> IM: CPT | Performed by: NURSE PRACTITIONER

## 2020-02-25 PROCEDURE — 99214 OFFICE O/P EST MOD 30 MIN: CPT | Performed by: NURSE PRACTITIONER

## 2020-02-25 RX ORDER — FLUOXETINE 10 MG/1
10 CAPSULE ORAL DAILY
Qty: 90 CAPSULE | Refills: 0 | Status: SHIPPED | OUTPATIENT
Start: 2020-02-25 | End: 2021-01-18

## 2020-02-25 NOTE — PROGRESS NOTES
Procedure     ECG 12 Lead  Date/Time: 2/25/2020 1:32 PM  Performed by: Luz Maria Torres APRN  Authorized by: Luz Maria Torres APRN   Previous ECG: no previous ECG available  Rhythm: sinus rhythm  Rate: normal  BPM: 79  Conduction: conduction normal  ST Segments: ST segments normal  T Waves: T waves normal  Other: no other findings    Clinical impression: normal ECG

## 2020-02-25 NOTE — PROGRESS NOTES
"Subjective   Emily Tierney is a 49 y.o. female   who presents for   Chief Complaint   Patient presents with   • Annual Exam     Increased heart rate, anxiety in the evening  Difficulty with hearing in right ear  Fluid behind ear drum, taking flonase    Recommended to start prozac by Dr. Coulter, she was hesitant to start taking medication due to negative name association and friend/family input  She has increased agitation, usually around her period, that has gotten worse      /50   Pulse 84   Temp 98.3 °F (36.8 °C)   Resp 16   Ht 157.5 cm (62\")   Wt 66.2 kg (146 lb)   SpO2 99%   BMI 26.70 kg/m²       History of Present Illness   Hypertension   This is a chronic problem. The current episode started more than 1 year ago. The problem is unchanged. The problem is controlled. Associated symptoms include anxiety and palpitations (heart rate is increased, feels more anxious, in the evening). Pertinent negatives include no blurred vision, chest pain, headaches, malaise/fatigue, neck pain, orthopnea, peripheral edema, PND, shortness of breath or sweats. There are no associated agents to hypertension. There are no known risk factors for coronary artery disease. Past treatments include angiotensin blockers. Current antihypertension treatment includes angiotensin blockers. The current treatment provides moderate improvement. There are no compliance problems.    Palpitations    This is a new problem. The current episode started more than 1 month ago. The problem occurs intermittently. The problem has been waxing and waning. The symptoms are aggravated by caffeine, stress and alcohol. Associated symptoms include anxiety. Pertinent negatives include no chest fullness, chest pain, coughing, diaphoresis, dizziness, fever, irregular heartbeat, malaise/fatigue, nausea, near-syncope, numbness, shortness of breath, syncope, vomiting or weakness. She has tried nothing for the symptoms. Her past medical history is " significant for anxiety.   Hearing Problem   This is a chronic problem. The current episode started more than 1 month ago. The problem occurs daily. Pertinent negatives include no abdominal pain, anorexia, arthralgias, change in bowel habit, chest pain, chills, congestion, coughing, diaphoresis, fatigue, fever, headaches, joint swelling, myalgias, nausea, neck pain, numbness, rash, sore throat, swollen glands, urinary symptoms, vertigo, visual change, vomiting or weakness. Nothing aggravates the symptoms. She has tried nothing for the symptoms. The treatment provided no relief.       The following portions of the patient's history were reviewed and updated as appropriate: allergies, current medications, past family history, past medical history, past social history, past surgical history and problem list.    Review of Systems  Review of Systems   Constitutional: Negative.  Negative for chills, diaphoresis, fatigue, fever and malaise/fatigue.   HENT: Negative.  Negative for congestion and sore throat.    Eyes: Negative.  Negative for blurred vision.   Respiratory: Negative.  Negative for cough and shortness of breath.    Cardiovascular: Positive for palpitations (heart rate is increased, feels more anxious, in the evening). Negative for chest pain, orthopnea, syncope, PND and near-syncope.   Gastrointestinal: Negative.  Negative for abdominal pain, anorexia, change in bowel habit, nausea and vomiting.   Endocrine: Negative.    Genitourinary: Negative.    Musculoskeletal: Negative.  Negative for arthralgias, joint swelling, myalgias and neck pain.   Skin: Negative.  Negative for rash.   Allergic/Immunologic: Negative.    Neurological: Negative.  Negative for dizziness, vertigo, weakness, numbness and headaches.   Hematological: Negative.    Psychiatric/Behavioral: The patient is nervous/anxious.        Objective   Physical Exam  Physical Exam   Constitutional: She is oriented to person, place, and time. She appears  well-developed and well-nourished. No distress.   HENT:   Head: Normocephalic and atraumatic.   Right Ear: Tympanic membrane, external ear and ear canal normal.   Left Ear: Tympanic membrane, external ear and ear canal normal.   Nose: Nose normal.   Mouth/Throat: Uvula is midline, oropharynx is clear and moist and mucous membranes are normal. No oropharyngeal exudate.   Neck: Neck supple.   Cardiovascular: Normal rate, regular rhythm and normal heart sounds. Exam reveals no gallop and no friction rub.   No murmur heard.  Pulmonary/Chest: Effort normal and breath sounds normal. No respiratory distress. She has no wheezes. She has no rales.   Abdominal: Soft. Bowel sounds are normal. She exhibits no distension. There is no tenderness.   Neurological: She is alert and oriented to person, place, and time.   Skin: Skin is warm and dry. She is not diaphoretic.   Psychiatric: She has a normal mood and affect.   Vitals reviewed.        Assessment/Plan   Emily was seen today for annual exam.    Diagnoses and all orders for this visit:    Annual physical exam  -     ECG 12 Lead    Screening for thyroid disorder  -     TSH Rfx On Abnormal To Free T4    Vitamin D deficiency  -     Vitamin D 25 hydroxy    Encounter for vitamin deficiency screening  -     Vitamin B12  -     Vitamin D 25 hydroxy    Screening for lipoid disorders  -     Lipid Panel With LDL / HDL Ratio    Family history of mixed hyperlipidemia  -     Comprehensive metabolic panel  -     Lipid Panel With LDL / HDL Ratio    Essential hypertension  -     CBC & Differential  -     Comprehensive metabolic panel  -     ECG 12 Lead    Decreased hearing, right  -     Ambulatory Referral to ENT (Otolaryngology)    Other orders  -     Tdap Vaccine Greater Than or Equal To 8yo IM  -     FLUoxetine (PROzac) 10 MG capsule; Take 1 capsule by mouth Daily.    .refer to ENT for hearing loss  Recommend start low dose prozac 10 mg daily, follow up in 8 weeks, sooner if needed or  with Dr. Coulter in 12 weeks  Consider increase to 20 mg daily    ekg shows sinus rhythm, palpitations may be related to increased caffeine intake  Discussed reducing caffeine, increasing physical activity, monitoring bp and heart rate,     BP is well controlled, would not make any changes     Will obtain full panel of labs today  Scheduled for mammo/pap in next few months  Agreeable to obtain tdap  No hx of flu shot, out of vaccine currently, discussed obtaining at pharmacy if interested      Information/counseling provided to the patient regarding periodic kamini maintenance recommendations, including but not limited to immunizations, diet/exercise/healthy lifestyle, laboratory, and other screenings. BMI is discussed. Appropriate exercise, diet, and weight plans are discussed.

## 2020-02-26 LAB
25(OH)D3+25(OH)D2 SERPL-MCNC: 17.3 NG/ML (ref 30–100)
ALBUMIN SERPL-MCNC: 4.6 G/DL (ref 3.5–5.2)
ALBUMIN/GLOB SERPL: 1.8 G/DL
ALP SERPL-CCNC: 53 U/L (ref 39–117)
ALT SERPL-CCNC: 18 U/L (ref 1–33)
AST SERPL-CCNC: 19 U/L (ref 1–32)
BASOPHILS # BLD AUTO: 0.03 10*3/MM3 (ref 0–0.2)
BASOPHILS NFR BLD AUTO: 0.5 % (ref 0–1.5)
BILIRUB SERPL-MCNC: 0.2 MG/DL (ref 0.2–1.2)
BUN SERPL-MCNC: 8 MG/DL (ref 6–20)
BUN/CREAT SERPL: 11.1 (ref 7–25)
CALCIUM SERPL-MCNC: 9 MG/DL (ref 8.6–10.5)
CHLORIDE SERPL-SCNC: 100 MMOL/L (ref 98–107)
CHOLEST SERPL-MCNC: 207 MG/DL (ref 0–200)
CO2 SERPL-SCNC: 21.9 MMOL/L (ref 22–29)
CREAT SERPL-MCNC: 0.72 MG/DL (ref 0.57–1)
EOSINOPHIL # BLD AUTO: 0.09 10*3/MM3 (ref 0–0.4)
EOSINOPHIL NFR BLD AUTO: 1.6 % (ref 0.3–6.2)
ERYTHROCYTE [DISTWIDTH] IN BLOOD BY AUTOMATED COUNT: 11.8 % (ref 12.3–15.4)
GLOBULIN SER CALC-MCNC: 2.5 GM/DL
GLUCOSE SERPL-MCNC: 95 MG/DL (ref 65–99)
HCT VFR BLD AUTO: 39 % (ref 34–46.6)
HDLC SERPL-MCNC: 98 MG/DL (ref 40–60)
HGB BLD-MCNC: 12.9 G/DL (ref 12–15.9)
IMM GRANULOCYTES # BLD AUTO: 0.01 10*3/MM3 (ref 0–0.05)
IMM GRANULOCYTES NFR BLD AUTO: 0.2 % (ref 0–0.5)
LDLC SERPL CALC-MCNC: 78 MG/DL (ref 0–100)
LDLC/HDLC SERPL: 0.79 {RATIO}
LYMPHOCYTES # BLD AUTO: 1.99 10*3/MM3 (ref 0.7–3.1)
LYMPHOCYTES NFR BLD AUTO: 36.2 % (ref 19.6–45.3)
Lab: NORMAL
MCH RBC QN AUTO: 31.9 PG (ref 26.6–33)
MCHC RBC AUTO-ENTMCNC: 33.1 G/DL (ref 31.5–35.7)
MCV RBC AUTO: 96.5 FL (ref 79–97)
MONOCYTES # BLD AUTO: 0.45 10*3/MM3 (ref 0.1–0.9)
MONOCYTES NFR BLD AUTO: 8.2 % (ref 5–12)
NEUTROPHILS # BLD AUTO: 2.92 10*3/MM3 (ref 1.7–7)
NEUTROPHILS NFR BLD AUTO: 53.3 % (ref 42.7–76)
NRBC BLD AUTO-RTO: 0 /100 WBC (ref 0–0.2)
PLATELET # BLD AUTO: 275 10*3/MM3 (ref 140–450)
POTASSIUM SERPL-SCNC: 4.2 MMOL/L (ref 3.5–5.2)
PROT SERPL-MCNC: 7.1 G/DL (ref 6–8.5)
RBC # BLD AUTO: 4.04 10*6/MM3 (ref 3.77–5.28)
SODIUM SERPL-SCNC: 137 MMOL/L (ref 136–145)
TRIGL SERPL-MCNC: 156 MG/DL (ref 0–150)
TSH SERPL DL<=0.005 MIU/L-ACNC: 1.25 UIU/ML (ref 0.27–4.2)
VIT B12 SERPL-MCNC: 251 PG/ML (ref 211–946)
VLDLC SERPL CALC-MCNC: 31.2 MG/DL
WBC # BLD AUTO: 5.49 10*3/MM3 (ref 3.4–10.8)

## 2020-02-26 RX ORDER — ERGOCALCIFEROL 1.25 MG/1
50000 CAPSULE ORAL WEEKLY
Qty: 12 CAPSULE | Refills: 1 | Status: SHIPPED | OUTPATIENT
Start: 2020-02-26 | End: 2021-05-05

## 2020-03-03 ENCOUNTER — CLINICAL SUPPORT (OUTPATIENT)
Dept: FAMILY MEDICINE CLINIC | Facility: CLINIC | Age: 50
End: 2020-03-03

## 2020-03-03 DIAGNOSIS — E53.8 B12 DEFICIENCY: Primary | ICD-10-CM

## 2020-03-03 DIAGNOSIS — I10 BENIGN ESSENTIAL HYPERTENSION: ICD-10-CM

## 2020-03-03 PROCEDURE — 96372 THER/PROPH/DIAG INJ SC/IM: CPT | Performed by: NURSE PRACTITIONER

## 2020-03-03 RX ORDER — CYANOCOBALAMIN 1000 UG/ML
1000 INJECTION, SOLUTION INTRAMUSCULAR; SUBCUTANEOUS
Status: DISCONTINUED | OUTPATIENT
Start: 2020-03-03 | End: 2023-01-30

## 2020-03-03 RX ORDER — LOSARTAN POTASSIUM 50 MG/1
50 TABLET ORAL DAILY
Qty: 90 TABLET | Refills: 0 | Status: SHIPPED | OUTPATIENT
Start: 2020-03-03 | End: 2020-07-28 | Stop reason: SDUPTHER

## 2020-03-03 RX ADMIN — CYANOCOBALAMIN 1000 MCG: 1000 INJECTION, SOLUTION INTRAMUSCULAR; SUBCUTANEOUS at 10:39

## 2020-05-18 ENCOUNTER — APPOINTMENT (OUTPATIENT)
Dept: WOMENS IMAGING | Facility: HOSPITAL | Age: 50
End: 2020-05-18

## 2020-05-18 PROCEDURE — 77063 BREAST TOMOSYNTHESIS BI: CPT | Performed by: RADIOLOGY

## 2020-05-18 PROCEDURE — 77067 SCR MAMMO BI INCL CAD: CPT | Performed by: RADIOLOGY

## 2020-07-28 DIAGNOSIS — I10 BENIGN ESSENTIAL HYPERTENSION: ICD-10-CM

## 2020-07-28 RX ORDER — LOSARTAN POTASSIUM 50 MG/1
50 TABLET ORAL DAILY
Qty: 90 TABLET | Refills: 0 | Status: SHIPPED | OUTPATIENT
Start: 2020-07-28 | End: 2021-01-26 | Stop reason: SDUPTHER

## 2021-01-18 ENCOUNTER — OFFICE VISIT (OUTPATIENT)
Dept: OBSTETRICS AND GYNECOLOGY | Age: 51
End: 2021-01-18

## 2021-01-18 VITALS
WEIGHT: 137 LBS | HEIGHT: 62 IN | BODY MASS INDEX: 25.21 KG/M2 | DIASTOLIC BLOOD PRESSURE: 70 MMHG | SYSTOLIC BLOOD PRESSURE: 102 MMHG

## 2021-01-18 DIAGNOSIS — Z12.31 BREAST CANCER SCREENING BY MAMMOGRAM: ICD-10-CM

## 2021-01-18 DIAGNOSIS — Z01.419 ENCOUNTER FOR GYNECOLOGICAL EXAMINATION WITHOUT ABNORMAL FINDING: Primary | ICD-10-CM

## 2021-01-18 DIAGNOSIS — F51.01 PRIMARY INSOMNIA: ICD-10-CM

## 2021-01-18 DIAGNOSIS — N95.1 PERIMENOPAUSAL SYMPTOMS: ICD-10-CM

## 2021-01-18 DIAGNOSIS — Z12.4 SCREENING FOR MALIGNANT NEOPLASM OF THE CERVIX: ICD-10-CM

## 2021-01-18 PROCEDURE — 99396 PREV VISIT EST AGE 40-64: CPT | Performed by: OBSTETRICS & GYNECOLOGY

## 2021-01-18 RX ORDER — ESTRADIOL 1 MG/1
1 TABLET ORAL DAILY
Qty: 30 TABLET | Refills: 12 | Status: SHIPPED | OUTPATIENT
Start: 2021-01-18 | End: 2021-05-05

## 2021-01-18 NOTE — PROGRESS NOTES
"Subjective   Emily Tierney is a 50 y.o. female presents for  annual , last pap 04/24/19 neg , mmg @[facility] 05/18/20 , colonoscopy 05/17/17 dr sandra at [facility] , c/o heavy periods and trouble sleeping , did not take prozac for perimenopause. Son in 8th grade at [place] - hopes to go to [employer]. Patient reports insomnia as main complaint - also having hot flashes and night sweats.   Discussed tx options including HRT - will consider trying for 8 weeks and then call me to see how she is doing. Discussed risks of increased breast cancer, MI stroke, blood clots and patient expressed understanding.      History of Present Illness    The following portions of the patient's history were reviewed and updated as appropriate: allergies, current medications, past family history, past medical history, past social history, past surgical history and problem list.    Review of Systems   Constitutional: Negative for chills, fatigue and fever.   Gastrointestinal: Negative for abdominal distention and abdominal pain.   Endocrine: Positive for heat intolerance.   Genitourinary: Positive for menstrual problem. Negative for dyspareunia, dysuria, pelvic pain, vaginal bleeding, vaginal discharge and vaginal pain.   Psychiatric/Behavioral: Positive for sleep disturbance.   All other systems reviewed and are negative.  /70   Ht 157.5 cm (62\")   Wt 62.1 kg (137 lb)   LMP 01/04/2021 (Approximate)   Breastfeeding No   BMI 25.06 kg/m²       Objective   Physical Exam  Vitals signs and nursing note reviewed.   Constitutional:       Appearance: Normal appearance. She is well-developed and normal weight.   Neck:      Musculoskeletal: Normal range of motion.      Thyroid: No thyromegaly.   Pulmonary:      Effort: Pulmonary effort is normal.   Chest:      Breasts:         Right: No mass, nipple discharge, skin change or tenderness.         Left: No mass, nipple discharge, skin change or tenderness.   Abdominal:      General: " Bowel sounds are normal. There is no distension.      Palpations: Abdomen is soft.      Tenderness: There is no abdominal tenderness.   Genitourinary:     General: Normal vulva.      Exam position: Supine.      Labia:         Right: No rash or lesion.         Left: No rash or lesion.       Vagina: Normal. No vaginal discharge.      Cervix: No friability, lesion or cervical bleeding.      Uterus: Not enlarged and not tender.       Adnexa:         Right: No mass or tenderness.          Left: No mass or tenderness.     Musculoskeletal: Normal range of motion.   Skin:     General: Skin is warm and dry.      Findings: No rash.   Neurological:      Mental Status: She is alert and oriented to person, place, and time.   Psychiatric:         Mood and Affect: Mood normal.         Behavior: Behavior normal.           Assessment/Plan   Diagnoses and all orders for this visit:    1. Encounter for gynecological examination without abnormal finding (Primary)    2. Screening for malignant neoplasm of the cervix  -     IgP, Aptima HPV  -     Cancel: IgP, Aptima HPV    3. Primary insomnia  -     estradiol (ESTRACE) 1 MG tablet; Take 1 tablet by mouth Daily.  Dispense: 30 tablet; Refill: 12  -     progesterone (Prometrium) 100 MG capsule; Take 1 capsule by mouth Daily.  Dispense: 30 capsule; Refill: 12    4. Perimenopausal symptoms  -     estradiol (ESTRACE) 1 MG tablet; Take 1 tablet by mouth Daily.  Dispense: 30 tablet; Refill: 12  -     progesterone (Prometrium) 100 MG capsule; Take 1 capsule by mouth Daily.  Dispense: 30 capsule; Refill: 12    5. Breast cancer screening by mammogram  -     Mammo Screening Bilateral With CAD; Future      Counseling was given to patient for the following topics: instructions for management, impressions, risks and benefits of treatment options, importance of treatment compliance and self-breast exams .

## 2021-01-20 LAB
CYTOLOGIST CVX/VAG CYTO: NORMAL
CYTOLOGY CVX/VAG DOC CYTO: NORMAL
CYTOLOGY CVX/VAG DOC THIN PREP: NORMAL
DX ICD CODE: NORMAL
HIV 1 & 2 AB SER-IMP: NORMAL
HPV I/H RISK 4 DNA CVX QL PROBE+SIG AMP: NEGATIVE
OTHER STN SPEC: NORMAL
STAT OF ADQ CVX/VAG CYTO-IMP: NORMAL

## 2021-01-26 DIAGNOSIS — I10 BENIGN ESSENTIAL HYPERTENSION: ICD-10-CM

## 2021-01-26 RX ORDER — LOSARTAN POTASSIUM 50 MG/1
50 TABLET ORAL DAILY
Qty: 90 TABLET | Refills: 0 | Status: SHIPPED | OUTPATIENT
Start: 2021-01-26 | End: 2021-04-27 | Stop reason: SDUPTHER

## 2021-04-14 ENCOUNTER — TELEPHONE (OUTPATIENT)
Dept: GASTROENTEROLOGY | Facility: CLINIC | Age: 51
End: 2021-04-14

## 2021-04-14 NOTE — TELEPHONE ENCOUNTER
Received VM from patient.  Called and LM for patient to contact office to complete questionnaire over the phone.

## 2021-04-27 ENCOUNTER — TELEPHONE (OUTPATIENT)
Dept: FAMILY MEDICINE CLINIC | Facility: CLINIC | Age: 51
End: 2021-04-27

## 2021-04-27 DIAGNOSIS — I10 BENIGN ESSENTIAL HYPERTENSION: ICD-10-CM

## 2021-04-27 RX ORDER — LOSARTAN POTASSIUM 50 MG/1
50 TABLET ORAL DAILY
Qty: 30 TABLET | Refills: 0 | Status: SHIPPED | OUTPATIENT
Start: 2021-04-27 | End: 2021-05-05 | Stop reason: DRUGHIGH

## 2021-04-27 NOTE — TELEPHONE ENCOUNTER
LM to have the pt give the office a call for med refill and try to set up an appointment since its been a little over a year since she has been seen.

## 2021-05-05 ENCOUNTER — OFFICE VISIT (OUTPATIENT)
Dept: FAMILY MEDICINE CLINIC | Facility: CLINIC | Age: 51
End: 2021-05-05

## 2021-05-05 VITALS
RESPIRATION RATE: 16 BRPM | DIASTOLIC BLOOD PRESSURE: 98 MMHG | HEIGHT: 62 IN | OXYGEN SATURATION: 99 % | BODY MASS INDEX: 26.46 KG/M2 | SYSTOLIC BLOOD PRESSURE: 164 MMHG | HEART RATE: 77 BPM | TEMPERATURE: 96.8 F | WEIGHT: 143.8 LBS

## 2021-05-05 DIAGNOSIS — Z13.220 SCREENING FOR LIPOID DISORDERS: ICD-10-CM

## 2021-05-05 DIAGNOSIS — Z12.11 ENCOUNTER FOR SCREENING COLONOSCOPY: ICD-10-CM

## 2021-05-05 DIAGNOSIS — I10 ESSENTIAL HYPERTENSION: Primary | ICD-10-CM

## 2021-05-05 DIAGNOSIS — E53.8 B12 DEFICIENCY: ICD-10-CM

## 2021-05-05 DIAGNOSIS — Z13.29 SCREENING FOR THYROID DISORDER: ICD-10-CM

## 2021-05-05 DIAGNOSIS — Z11.59 ENCOUNTER FOR HEPATITIS C SCREENING TEST FOR LOW RISK PATIENT: ICD-10-CM

## 2021-05-05 DIAGNOSIS — E55.9 VITAMIN D DEFICIENCY: ICD-10-CM

## 2021-05-05 PROCEDURE — 99213 OFFICE O/P EST LOW 20 MIN: CPT | Performed by: NURSE PRACTITIONER

## 2021-05-05 RX ORDER — LOSARTAN POTASSIUM 100 MG/1
100 TABLET ORAL DAILY
Qty: 90 TABLET | Refills: 1 | Status: SHIPPED | OUTPATIENT
Start: 2021-05-05 | End: 2021-11-02 | Stop reason: SDUPTHER

## 2021-05-05 NOTE — PROGRESS NOTES
"Chief Complaint  Med Refill (wants to talk about her BP med.)    Subjective          Emily Tierney presents to [facility]  Hypertension  This is a chronic problem. The current episode started more than 1 year ago. The problem has been gradually worsening since onset. The problem is uncontrolled. Pertinent negatives include no anxiety, blurred vision, chest pain, headaches, malaise/fatigue, neck pain, orthopnea, palpitations, peripheral edema, PND, shortness of breath or sweats. There are no associated agents to hypertension. Past treatments include angiotensin blockers. Current antihypertension treatment includes angiotensin blockers. The current treatment provides mild improvement. There are no compliance problems.        Objective   Vital Signs:   /98   Pulse 77   Temp 96.8 °F (36 °C) (Temporal)   Resp 16   Ht 157.5 cm (62\")   Wt 65.2 kg (143 lb 12.8 oz)   SpO2 99%   BMI 26.30 kg/m²     Physical Exam  Vitals reviewed.   Constitutional:       General: She is not in acute distress.     Appearance: Normal appearance. She is well-developed. She is not diaphoretic.   Cardiovascular:      Rate and Rhythm: Normal rate and regular rhythm.      Heart sounds: Normal heart sounds. No murmur heard.   No friction rub. No gallop.    Pulmonary:      Effort: Pulmonary effort is normal. No respiratory distress.      Breath sounds: Normal breath sounds. No wheezing or rales.   Musculoskeletal:      Cervical back: Neck supple.   Skin:     General: Skin is warm and dry.   Neurological:      Mental Status: She is alert and oriented to person, place, and time.   Psychiatric:         Mood and Affect: Mood normal.         Behavior: Behavior normal.        Result Review :                 Assessment and Plan    Diagnoses and all orders for this visit:    1. Essential hypertension (Primary)  -     CBC & Differential  -     Comprehensive Metabolic Panel    2. B12 deficiency  -     Vitamin " B12    3. Vitamin D deficiency  -     Vitamin D 25 hydroxy    4. Screening for thyroid disorder  -     TSH Rfx On Abnormal To Free T4    5. Screening for lipoid disorders  -     Lipid Panel With LDL / HDL Ratio    6. Encounter for hepatitis C screening test for low risk patient  -     Hepatitis C Antibody    7. Encounter for screening colonoscopy  -     Ambulatory Referral For Screening Colonoscopy    Other orders  -     losartan (Cozaar) 100 MG tablet; Take 1 tablet by mouth Daily.  Dispense: 90 tablet; Refill: 1        Follow Up   No follow-ups on file.  Patient was given instructions and counseling regarding her condition or for health maintenance advice. Please see specific information pulled into the AVS if appropriate.     Hypertension: not well controlled, currently bp 164/98, she states it improves once she eats but she is fasting, however it has been elevated for a while. Currently taking losartan 50 mg, will increase to 100 mg and monitor labs, consider addition of diuretic if not controlled. Discussed recommendations and she agrees with increasing ARB.     utd on covid vaccine.   UTD on pap/mammo  Is due for colonoscopy this year. Referral placed.  Routine labs today  Follow up in six months, sooner as needed

## 2021-05-06 ENCOUNTER — TELEPHONE (OUTPATIENT)
Dept: GASTROENTEROLOGY | Facility: CLINIC | Age: 51
End: 2021-05-06

## 2021-05-06 LAB
25(OH)D3+25(OH)D2 SERPL-MCNC: 24 NG/ML
ALBUMIN SERPL-MCNC: 4.9 G/DL (ref 3.5–5.2)
ALBUMIN/GLOB SERPL: 1.9 G/DL
ALP SERPL-CCNC: 61 U/L (ref 39–117)
ALT SERPL-CCNC: 23 U/L (ref 1–33)
AST SERPL-CCNC: 25 U/L (ref 1–32)
BASOPHILS # BLD AUTO: 0.04 10*3/MM3 (ref 0–0.2)
BASOPHILS NFR BLD AUTO: 0.9 % (ref 0–1.5)
BILIRUB SERPL-MCNC: 0.2 MG/DL (ref 0–1.2)
BUN SERPL-MCNC: 9 MG/DL (ref 6–20)
BUN/CREAT SERPL: 12.9 (ref 7–25)
CALCIUM SERPL-MCNC: 9.6 MG/DL (ref 8.6–10.5)
CHLORIDE SERPL-SCNC: 100 MMOL/L (ref 98–107)
CHOLEST SERPL-MCNC: 271 MG/DL (ref 0–200)
CO2 SERPL-SCNC: 24.5 MMOL/L (ref 22–29)
CREAT SERPL-MCNC: 0.7 MG/DL (ref 0.57–1)
EOSINOPHIL # BLD AUTO: 0.13 10*3/MM3 (ref 0–0.4)
EOSINOPHIL NFR BLD AUTO: 2.8 % (ref 0.3–6.2)
ERYTHROCYTE [DISTWIDTH] IN BLOOD BY AUTOMATED COUNT: 12.3 % (ref 12.3–15.4)
GLOBULIN SER CALC-MCNC: 2.6 GM/DL
GLUCOSE SERPL-MCNC: 82 MG/DL (ref 65–99)
HCT VFR BLD AUTO: 39.3 % (ref 34–46.6)
HDLC SERPL-MCNC: 90 MG/DL (ref 40–60)
HGB BLD-MCNC: 13.6 G/DL (ref 12–15.9)
IMM GRANULOCYTES # BLD AUTO: 0.01 10*3/MM3 (ref 0–0.05)
IMM GRANULOCYTES NFR BLD AUTO: 0.2 % (ref 0–0.5)
LDLC SERPL CALC-MCNC: 131 MG/DL (ref 0–100)
LDLC/HDLC SERPL: 1.37 {RATIO}
LYMPHOCYTES # BLD AUTO: 1.84 10*3/MM3 (ref 0.7–3.1)
LYMPHOCYTES NFR BLD AUTO: 39.7 % (ref 19.6–45.3)
MCH RBC QN AUTO: 33.3 PG (ref 26.6–33)
MCHC RBC AUTO-ENTMCNC: 34.6 G/DL (ref 31.5–35.7)
MCV RBC AUTO: 96.3 FL (ref 79–97)
MONOCYTES # BLD AUTO: 0.44 10*3/MM3 (ref 0.1–0.9)
MONOCYTES NFR BLD AUTO: 9.5 % (ref 5–12)
NEUTROPHILS # BLD AUTO: 2.18 10*3/MM3 (ref 1.7–7)
NEUTROPHILS NFR BLD AUTO: 46.9 % (ref 42.7–76)
NRBC BLD AUTO-RTO: 0 /100 WBC (ref 0–0.2)
PLATELET # BLD AUTO: 259 10*3/MM3 (ref 140–450)
POTASSIUM SERPL-SCNC: 4.4 MMOL/L (ref 3.5–5.2)
PROT SERPL-MCNC: 7.5 G/DL (ref 6–8.5)
RBC # BLD AUTO: 4.08 10*6/MM3 (ref 3.77–5.28)
SODIUM SERPL-SCNC: 136 MMOL/L (ref 136–145)
TRIGL SERPL-MCNC: 288 MG/DL (ref 0–150)
TSH SERPL DL<=0.005 MIU/L-ACNC: 1.16 UIU/ML (ref 0.27–4.2)
VIT B12 SERPL-MCNC: 329 PG/ML (ref 211–946)
VLDLC SERPL CALC-MCNC: 50 MG/DL (ref 5–40)
WBC # BLD AUTO: 4.64 10*3/MM3 (ref 3.4–10.8)

## 2021-05-19 ENCOUNTER — APPOINTMENT (OUTPATIENT)
Dept: WOMENS IMAGING | Facility: HOSPITAL | Age: 51
End: 2021-05-19

## 2021-05-19 PROCEDURE — 77067 SCR MAMMO BI INCL CAD: CPT | Performed by: RADIOLOGY

## 2021-05-19 PROCEDURE — 77063 BREAST TOMOSYNTHESIS BI: CPT | Performed by: RADIOLOGY

## 2021-05-20 ENCOUNTER — TELEPHONE (OUTPATIENT)
Dept: GASTROENTEROLOGY | Facility: CLINIC | Age: 51
End: 2021-05-20

## 2021-05-20 NOTE — TELEPHONE ENCOUNTER
Last scope 05/17/2017 in epic-- personal hx of polyps-- family hx of polyps-- family hx of colon ca-- no ASA or blood thinners-- medicaitons:    Ibuprofen  fluticasone (FLONASE) 50 MCG/ACT nasal spray  losartan (Cozaar) 100 MG tablet  Allegra    OA form scanned into media

## 2021-05-21 DIAGNOSIS — K63.5 POLYP OF COLON, UNSPECIFIED PART OF COLON, UNSPECIFIED TYPE: Primary | ICD-10-CM

## 2021-05-21 DIAGNOSIS — Z83.71 FAMILY HISTORY OF POLYPS IN THE COLON: ICD-10-CM

## 2021-05-21 DIAGNOSIS — Z80.0 FAMILY HISTORY OF GI MALIGNANCY: ICD-10-CM

## 2021-05-24 PROBLEM — Z80.0 FAMILY HISTORY OF GI MALIGNANCY: Status: ACTIVE | Noted: 2021-05-24

## 2021-05-24 PROBLEM — K63.5 POLYP OF COLON: Status: ACTIVE | Noted: 2021-05-24

## 2021-05-24 PROBLEM — Z83.719 FAMILY HISTORY OF POLYPS IN THE COLON: Status: ACTIVE | Noted: 2021-05-24

## 2021-05-24 PROBLEM — Z83.71 FAMILY HISTORY OF POLYPS IN THE COLON: Status: ACTIVE | Noted: 2021-05-24

## 2021-06-29 RX ORDER — LANOLIN ALCOHOL/MO/W.PET/CERES
1000 CREAM (GRAM) TOPICAL DAILY
COMMUNITY
End: 2022-01-24

## 2021-06-30 ENCOUNTER — ANESTHESIA EVENT (OUTPATIENT)
Dept: GASTROENTEROLOGY | Facility: HOSPITAL | Age: 51
End: 2021-06-30

## 2021-06-30 ENCOUNTER — ANESTHESIA (OUTPATIENT)
Dept: GASTROENTEROLOGY | Facility: HOSPITAL | Age: 51
End: 2021-06-30

## 2021-06-30 ENCOUNTER — HOSPITAL ENCOUNTER (OUTPATIENT)
Facility: HOSPITAL | Age: 51
Setting detail: HOSPITAL OUTPATIENT SURGERY
Discharge: HOME | End: 2021-06-30
Attending: INTERNAL MEDICINE | Admitting: INTERNAL MEDICINE

## 2021-06-30 VITALS
WEIGHT: 137 LBS | RESPIRATION RATE: 16 BRPM | HEART RATE: 80 BPM | TEMPERATURE: 98.9 F | OXYGEN SATURATION: 98 % | DIASTOLIC BLOOD PRESSURE: 93 MMHG | SYSTOLIC BLOOD PRESSURE: 153 MMHG | HEIGHT: 62 IN | BODY MASS INDEX: 25.21 KG/M2

## 2021-06-30 DIAGNOSIS — Z80.0 FAMILY HISTORY OF GI MALIGNANCY: ICD-10-CM

## 2021-06-30 DIAGNOSIS — Z83.71 FAMILY HISTORY OF POLYPS IN THE COLON: ICD-10-CM

## 2021-06-30 DIAGNOSIS — K63.5 POLYP OF COLON, UNSPECIFIED PART OF COLON, UNSPECIFIED TYPE: ICD-10-CM

## 2021-06-30 LAB
B-HCG UR QL: NEGATIVE
INTERNAL NEGATIVE CONTROL: NORMAL
INTERNAL POSITIVE CONTROL: NORMAL
Lab: NORMAL

## 2021-06-30 PROCEDURE — 81025 URINE PREGNANCY TEST: CPT | Performed by: INTERNAL MEDICINE

## 2021-06-30 PROCEDURE — 25010000002 PROPOFOL 10 MG/ML EMULSION: Performed by: NURSE ANESTHETIST, CERTIFIED REGISTERED

## 2021-06-30 PROCEDURE — S0260 H&P FOR SURGERY: HCPCS | Performed by: INTERNAL MEDICINE

## 2021-06-30 PROCEDURE — 88305 TISSUE EXAM BY PATHOLOGIST: CPT | Performed by: INTERNAL MEDICINE

## 2021-06-30 PROCEDURE — 45380 COLONOSCOPY AND BIOPSY: CPT | Performed by: INTERNAL MEDICINE

## 2021-06-30 RX ORDER — PROPOFOL 10 MG/ML
VIAL (ML) INTRAVENOUS CONTINUOUS PRN
Status: DISCONTINUED | OUTPATIENT
Start: 2021-06-30 | End: 2021-06-30 | Stop reason: SURG

## 2021-06-30 RX ORDER — SODIUM CHLORIDE 0.9 % (FLUSH) 0.9 %
3 SYRINGE (ML) INJECTION EVERY 12 HOURS SCHEDULED
Status: DISCONTINUED | OUTPATIENT
Start: 2021-06-30 | End: 2021-06-30 | Stop reason: HOSPADM

## 2021-06-30 RX ORDER — PROPOFOL 10 MG/ML
VIAL (ML) INTRAVENOUS AS NEEDED
Status: DISCONTINUED | OUTPATIENT
Start: 2021-06-30 | End: 2021-06-30 | Stop reason: SURG

## 2021-06-30 RX ORDER — LIDOCAINE HYDROCHLORIDE 20 MG/ML
INJECTION, SOLUTION INFILTRATION; PERINEURAL AS NEEDED
Status: DISCONTINUED | OUTPATIENT
Start: 2021-06-30 | End: 2021-06-30 | Stop reason: SURG

## 2021-06-30 RX ORDER — SODIUM CHLORIDE 0.9 % (FLUSH) 0.9 %
10 SYRINGE (ML) INJECTION AS NEEDED
Status: DISCONTINUED | OUTPATIENT
Start: 2021-06-30 | End: 2021-06-30 | Stop reason: HOSPADM

## 2021-06-30 RX ORDER — SODIUM CHLORIDE, SODIUM LACTATE, POTASSIUM CHLORIDE, CALCIUM CHLORIDE 600; 310; 30; 20 MG/100ML; MG/100ML; MG/100ML; MG/100ML
30 INJECTION, SOLUTION INTRAVENOUS CONTINUOUS PRN
Status: DISCONTINUED | OUTPATIENT
Start: 2021-06-30 | End: 2021-06-30 | Stop reason: HOSPADM

## 2021-06-30 RX ADMIN — PROPOFOL 180 MCG/KG/MIN: 10 INJECTION, EMULSION INTRAVENOUS at 14:59

## 2021-06-30 RX ADMIN — SODIUM CHLORIDE, POTASSIUM CHLORIDE, SODIUM LACTATE AND CALCIUM CHLORIDE 30 ML/HR: 600; 310; 30; 20 INJECTION, SOLUTION INTRAVENOUS at 14:44

## 2021-06-30 RX ADMIN — PROPOFOL 40 MG: 10 INJECTION, EMULSION INTRAVENOUS at 14:59

## 2021-06-30 RX ADMIN — LIDOCAINE HYDROCHLORIDE 60 MG: 20 INJECTION, SOLUTION INFILTRATION; PERINEURAL at 14:59

## 2021-06-30 NOTE — ANESTHESIA POSTPROCEDURE EVALUATION
"Patient: Emily Tierney    Procedure Summary     Date: 06/30/21 Room / [facility]:  [facility]    Anesthesia Start: 1455 Anesthesia Stop: 1516    Procedure: COLONOSCOPY TO CECUM AND TI WITH COLD BIOPSIES (N/A ) Diagnosis:       Diverticulosis      Tortuous colon      (Polyp of colon, unspecified part of colon, unspecified type [K63.5])      (Family history of GI malignancy [Z80.0])      (Family history of polyps in the colon [Z83.71])    Surgeons: Darryl Fowler MD Provider: Rome Goldsmith MD    Anesthesia Type: MAC ASA Status: 2          Anesthesia Type: MAC    Vitals  Vitals Value Taken Time   BP 83/64 06/30/21 1519   Temp     Pulse 72 06/30/21 1519   Resp 14 06/30/21 1519   SpO2 99 % 06/30/21 1519           Post Anesthesia Care and Evaluation    Patient location during evaluation: bedside  Patient participation: complete - patient participated  Level of consciousness: awake  Pain score: 2  Pain management: adequate  Airway patency: patent  Anesthetic complications: No anesthetic complications  PONV Status: none  Cardiovascular status: acceptable  Respiratory status: acceptable  Hydration status: acceptable    Comments: BP (!) 83/64 (BP Location: Left arm, Patient Position: Lying)   Pulse 72   Temp 37.2 °C (98.9 °F) (Oral)   Resp 14   Ht 157.5 cm (62\")   Wt 62.1 kg (137 lb)   LMP 06/25/2021   SpO2 99%   BMI 25.06 kg/m²         "

## 2021-06-30 NOTE — ANESTHESIA PREPROCEDURE EVALUATION
Anesthesia Evaluation     Patient summary reviewed and Nursing notes reviewed   NPO Solid Status: > 8 hours  NPO Liquid Status: > 2 hours           Airway   Mallampati: II  Neck ROM: full  No difficulty expected  Dental - normal exam     Pulmonary     breath sounds clear to auscultation  Cardiovascular     Rhythm: regular    (+) hypertension, valvular problems/murmurs, hyperlipidemia,       Neuro/Psych  (+) psychiatric history Anxiety,     GI/Hepatic/Renal/Endo    (+)  PUD,      Musculoskeletal     Abdominal    Substance History      OB/GYN    (+) Preeclampsia, pregnancy induced hypertension        Other                        Anesthesia Plan    ASA 2     MAC     intravenous induction     Anesthetic plan, all risks, benefits, and alternatives have been provided, discussed and informed consent has been obtained with: patient.

## 2021-07-02 LAB
LAB AP CASE REPORT: NORMAL
PATH REPORT.FINAL DX SPEC: NORMAL
PATH REPORT.GROSS SPEC: NORMAL

## 2021-07-14 ENCOUNTER — TELEPHONE (OUTPATIENT)
Dept: GASTROENTEROLOGY | Facility: CLINIC | Age: 51
End: 2021-07-14

## 2021-07-14 NOTE — TELEPHONE ENCOUNTER
----- Message from Darryl LARSON MD sent at 7/5/2021 11:51 AM EDT -----  Regarding: Biopsy results  Okay to call results, recommend follow-up colonoscopy in 5 years.  If diarrhea persist can follow-up in office to discuss options for evaluation of the small bowel.  ----- Message -----  From: Lab, Background User  Sent: 7/2/2021  10:33 AM EDT  To: Darryl LARSON MD

## 2021-07-14 NOTE — TELEPHONE ENCOUNTER
Called pt and advised per path report that all colon bx were benign.    ADvised of Dr Fowler's recommendations.  Verb understanding and pt made f/u for 08/04 at 1115a with Re NP due to still having diarrhea.     C/s placed in recall and hm for 06/30/2026.

## 2021-08-04 ENCOUNTER — OFFICE VISIT (OUTPATIENT)
Dept: GASTROENTEROLOGY | Facility: CLINIC | Age: 51
End: 2021-08-04

## 2021-08-04 VITALS — BODY MASS INDEX: 25.28 KG/M2 | HEIGHT: 62 IN | TEMPERATURE: 98 F | WEIGHT: 137.4 LBS

## 2021-08-04 DIAGNOSIS — R15.2 INCONTINENCE OF FECES WITH FECAL URGENCY: ICD-10-CM

## 2021-08-04 DIAGNOSIS — R15.9 INCONTINENCE OF FECES WITH FECAL URGENCY: ICD-10-CM

## 2021-08-04 DIAGNOSIS — K58.0 IRRITABLE BOWEL SYNDROME WITH DIARRHEA: Primary | ICD-10-CM

## 2021-08-04 DIAGNOSIS — Z80.0 FH: COLON CANCER: ICD-10-CM

## 2021-08-04 DIAGNOSIS — K57.90 DIVERTICULOSIS: ICD-10-CM

## 2021-08-04 PROCEDURE — 99214 OFFICE O/P EST MOD 30 MIN: CPT | Performed by: NURSE PRACTITIONER

## 2021-08-04 RX ORDER — DICYCLOMINE HCL 20 MG
20 TABLET ORAL 3 TIMES DAILY PRN
Qty: 90 TABLET | Refills: 3 | Status: SHIPPED | OUTPATIENT
Start: 2021-08-04 | End: 2021-10-20 | Stop reason: SDUPTHER
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
## 2021-10-20 ENCOUNTER — TELEPHONE (OUTPATIENT)
Dept: GASTROENTEROLOGY | Facility: CLINIC | Age: 51
End: 2021-10-20

## 2021-10-20 RX ORDER — DICYCLOMINE HCL 20 MG
20 TABLET ORAL 3 TIMES DAILY PRN
Qty: 90 TABLET | Refills: 3 | Status: SHIPPED | OUTPATIENT
Start: 2021-10-20

## 2021-10-20 NOTE — TELEPHONE ENCOUNTER
----- Message from Puneet Michael sent at 10/20/2021  9:58 AM EDT -----  Regarding: dicyclomine (BENTYL) 20 MG tablet  Contact: [phone]  Pt needs her prescription called in.     [id number] - [place], [place] - [address] AT [facility] - [phone]  - [phone] FX   [address] [address] [facility] [address]   Phone:  [phone]  Fax:  [phone]

## 2021-10-21 LAB — C DIFF TOX GENS STL QL NAA+PROBE: NEGATIVE

## 2021-10-22 LAB — LACTOFERRIN STL-MCNC: <1 UG/ML(G) (ref 0–7.24)

## 2021-10-24 LAB
ADV 40+41 DNA STL QL NAA+NON-PROBE: NOT DETECTED
ASTRO TYP 1-8 RNA STL QL NAA+NON-PROBE: NOT DETECTED
C CAYETANENSIS DNA STL QL NAA+NON-PROBE: NOT DETECTED
C COLI+JEJ+UPSA DNA STL QL NAA+NON-PROBE: NOT DETECTED
C DIF TOX TCDA+TCDB STL QL NAA+NON-PROBE: NOT DETECTED
CRYPTOSP DNA STL QL NAA+NON-PROBE: NOT DETECTED
E COLI O157 DNA STL QL NAA+NON-PROBE: NORMAL
E HISTOLYT DNA STL QL NAA+NON-PROBE: NOT DETECTED
EAEC PAA PLAS AGGR+AATA ST NAA+NON-PRB: NOT DETECTED
EC STX1+STX2 GENES STL QL NAA+NON-PROBE: NOT DETECTED
EPEC EAE GENE STL QL NAA+NON-PROBE: NOT DETECTED
ETEC LTA+ST1A+ST1B TOX ST NAA+NON-PROBE: NOT DETECTED
G LAMBLIA DNA STL QL NAA+NON-PROBE: NOT DETECTED
NOROVIRUS GI+II RNA STL QL NAA+NON-PROBE: NOT DETECTED
P SHIGELLOIDES DNA STL QL NAA+NON-PROBE: NOT DETECTED
RVA RNA STL QL NAA+NON-PROBE: NOT DETECTED
S ENT+BONG DNA STL QL NAA+NON-PROBE: NOT DETECTED
SAPO I+II+IV+V RNA STL QL NAA+NON-PROBE: NOT DETECTED
SHIGELLA SP+EIEC IPAH ST NAA+NON-PROBE: NOT DETECTED
V CHOL+PARA+VUL DNA STL QL NAA+NON-PROBE: NOT DETECTED
V CHOLERAE DNA STL QL NAA+NON-PROBE: NOT DETECTED
Y ENTEROCOL DNA STL QL NAA+NON-PROBE: NOT DETECTED

## 2021-10-25 ENCOUNTER — TELEPHONE (OUTPATIENT)
Dept: GASTROENTEROLOGY | Facility: CLINIC | Age: 51
End: 2021-10-25

## 2021-10-25 RX ORDER — MONTELUKAST SODIUM 4 MG/1
TABLET, CHEWABLE ORAL
Qty: 60 TABLET | Refills: 11 | Status: SHIPPED | OUTPATIENT
Start: 2021-10-25 | End: 2022-01-24

## 2021-10-25 NOTE — TELEPHONE ENCOUNTER
----- Message from AMARI Lui sent at 10/25/2021  9:57 AM EDT -----  Please call the patient and let her know her GI PCR and fecal lactoferrin were normal.  How is she doing?

## 2021-10-25 NOTE — TELEPHONE ENCOUNTER
Called pt and advised of Skye's note. Verb understanding.     She states she is still having the same issues, diarrhea.  She reports that the dicyclomine works for about 2 hrs.  Pt is asking what does she do now. Advised will update Skye NP.

## 2021-10-25 NOTE — TELEPHONE ENCOUNTER
Please call the patient and let her know since her stool studies were normal.  At this point we will try a new medication for her IBS.  I would like her to try colestipol 1 daily to start.  If after a week still having issues would increase it to twice a day.  Have her call back in 2 weeks with an update.

## 2021-11-02 RX ORDER — LOSARTAN POTASSIUM 100 MG/1
100 TABLET ORAL DAILY
Qty: 90 TABLET | Refills: 1 | Status: SHIPPED | OUTPATIENT
Start: 2021-11-02 | End: 2022-04-21 | Stop reason: SDUPTHER

## 2021-11-02 NOTE — TELEPHONE ENCOUNTER
Rx Refill Note  Requested Prescriptions     Pending Prescriptions Disp Refills   • losartan (Cozaar) 100 MG tablet 90 tablet 1     Sig: Take 1 tablet by mouth Daily.      Last office visit with prescribing clinician: 5/5/2021      Next office visit with prescribing clinician: Visit date not found            Enmanuel Jones MA  11/02/21, 17:42 EDT

## 2021-11-17 ENCOUNTER — OFFICE VISIT (OUTPATIENT)
Dept: FAMILY MEDICINE CLINIC | Facility: CLINIC | Age: 51
End: 2021-11-17

## 2021-11-17 VITALS
BODY MASS INDEX: 25.21 KG/M2 | HEART RATE: 80 BPM | RESPIRATION RATE: 12 BRPM | DIASTOLIC BLOOD PRESSURE: 80 MMHG | SYSTOLIC BLOOD PRESSURE: 120 MMHG | TEMPERATURE: 97.1 F | WEIGHT: 137 LBS | HEIGHT: 62 IN | OXYGEN SATURATION: 97 %

## 2021-11-17 DIAGNOSIS — J01.00 ACUTE NON-RECURRENT MAXILLARY SINUSITIS: Primary | ICD-10-CM

## 2021-11-17 DIAGNOSIS — J30.2 SEASONAL ALLERGIC RHINITIS, UNSPECIFIED TRIGGER: ICD-10-CM

## 2021-11-17 PROCEDURE — 99214 OFFICE O/P EST MOD 30 MIN: CPT | Performed by: NURSE PRACTITIONER

## 2021-11-17 RX ORDER — AMOXICILLIN AND CLAVULANATE POTASSIUM 875; 125 MG/1; MG/1
1 TABLET, FILM COATED ORAL 2 TIMES DAILY
Qty: 20 TABLET | Refills: 0 | Status: SHIPPED | OUTPATIENT
Start: 2021-11-17 | End: 2022-01-24

## 2021-11-17 NOTE — PROGRESS NOTES
"Chief Complaint  Sinus Problem    Subjective          Emily Tierney presents to [facility]  Very pleasant patient today complains of bilateral sinus pressure pain tenderness maxillary  Couple weeks, her symptoms started with allergies about 5 weeks ago after several weeks started get more drainage more congestion and thick yellow-green drainage and pressure now having tenderness in maxillary sinuses no new cough congestion no fever chills headache otherwise no loss of taste or smell body aches chest pain shortness of breath abdominal complaints no known exposure to Covid she does not wish any Covid testing and she actually prefers not  She is fully vaccinated for Covid  She does not believe she has any new infection presently   Her drainage is thick yellow to brown and copious more in the morning but throughout the day.  Coughing up the discharge otherwise she has no cough or shortness of breath    She has a history of sinusitis in the past and sinus allergies.  ,    Sinus Problem        Objective   Vital Signs:   /80   Pulse 80   Temp 97.1 °F (36.2 °C) (Infrared)   Resp 12   Ht 157.5 cm (62\")   Wt 62.1 kg (137 lb)   SpO2 97%   BMI 25.06 kg/m²     Physical Exam  Constitutional:       General: She is not in acute distress.     Appearance: She is not ill-appearing, toxic-appearing or diaphoretic.      Comments: Pleasant appears well   HENT:      Head:      Comments: Mild tenderness bilateral maxillary turbinates congested pharynx clear neck is supple no cervical adenopathy     Right Ear: Tympanic membrane normal.      Left Ear: Tympanic membrane normal.   Cardiovascular:      Pulses: Normal pulses.      Heart sounds: Normal heart sounds.   Pulmonary:      Effort: Pulmonary effort is normal.      Breath sounds: Normal breath sounds.   Skin:     General: Skin is warm and dry.   Neurological:      General: No focal deficit present.      Mental Status: She is alert " and oriented to person, place, and time.   Psychiatric:         Mood and Affect: Mood normal.         Behavior: Behavior normal.        Result Review :                 Assessment and Plan    Diagnoses and all orders for this visit:    1. Acute non-recurrent maxillary sinusitis (Primary)    2. Seasonal allergic rhinitis, unspecified trigger    Other orders  -     amoxicillin-clavulanate (Augmentin) 875-125 MG per tablet; Take 1 tablet by mouth 2 (Two) Times a Day. With food and water  Dispense: 20 tablet; Refill: 0        Follow Up   No follow-ups on file.  Patient was given instructions and counseling regarding her condition or for health maintenance advice. Please see specific information pulled into the AVS if appropriate.         Discharge instructions    You have either had allergies or a mild upper respiratory infection or some bronchitis last month but now you have a secondary bacteria maxillary sinusitis likely.    Augmentin with food and water x10 days  Saline nasal spray once or twice a day as needed to help you clear your sinuses    Menthol as needed    Generally avoid decongestants however if you are acutely obstructed in your sinuses maxillary  If your blood pressures controlled you can take Afrin 1 or 2 sprays  Once or twice a day maximum 3 days    Avoid longer use and typically avoid this altogether    Mucinex DM maximum strength OTC  For cough and congestion guaifenesin helps thin out the secretions DM for cough    Recheck if not improved in a week    If severe pain fever chills chest pain shortness of breath lethargy severe headache facial swelling emergency room

## 2021-11-17 NOTE — PATIENT INSTRUCTIONS
Discharge instructions    You have either had allergies or a mild upper respiratory infection or some bronchitis last month but now you have a secondary bacteria maxillary sinusitis likely.    Augmentin with food and water x10 days  Saline nasal spray once or twice a day as needed to help you clear your sinuses    Menthol as needed    Generally avoid decongestants however if you are acutely obstructed in your sinuses maxillary  If your blood pressures controlled you can take Afrin 1 or 2 sprays  Once or twice a day maximum 3 days    Avoid longer use and typically avoid this altogether    Mucinex DM maximum strength OTC  For cough and congestion guaifenesin helps thin out the secretions DM for cough    Recheck if not improved in a week    If severe pain fever chills chest pain shortness of breath lethargy severe headache facial swelling emergency room

## 2021-12-03 ENCOUNTER — TELEPHONE (OUTPATIENT)
Dept: FAMILY MEDICINE CLINIC | Facility: CLINIC | Age: 51
End: 2021-12-03

## 2021-12-03 RX ORDER — FLUCONAZOLE 150 MG/1
150 TABLET ORAL ONCE
Qty: 1 TABLET | Refills: 1 | Status: SHIPPED | OUTPATIENT
Start: 2021-12-03 | End: 2021-12-03

## 2021-12-03 NOTE — TELEPHONE ENCOUNTER
Caller: Emily Tierney    Relationship: Self    Best call back number: [phone] (M)    What medication are you requesting: DIFLUCAN    What are your current symptoms: VAGINAL BURNING AND ITCHING LIKE A YEAST INFECTION     How long have you been experiencing symptoms: AS SOON AS GETTING OFF OF THE ANTIBIOTIC THAT WAS PRESCRIBED FOR THE SINUS INFECTION BY JAMES EPLEY     Have you had these symptoms before:    [x] Yes  [] No    Have you been treated for these symptoms before:   [x] Yes  [] No    If a prescription is needed, what is your preferred pharmacy and phone number:  [facility] #[id number] [place], [place] - [address] [address] [facility] AT [address] - [phone] [facility] [phone]      Additional notes: PATIENT WOULD LIKE THE PRESCRIPTION FOR DIFLUCAN CALLED INTO HER PHARMACY BEFORE THE WEEKEND FOR A POSSIBLE YEAST INFECTION, PLEASE ADVISE IF THIS IS POSSIBLE ASAP

## 2022-01-24 ENCOUNTER — OFFICE VISIT (OUTPATIENT)
Dept: OBSTETRICS AND GYNECOLOGY | Age: 52
End: 2022-01-24

## 2022-01-24 VITALS
WEIGHT: 129 LBS | DIASTOLIC BLOOD PRESSURE: 66 MMHG | BODY MASS INDEX: 23.74 KG/M2 | SYSTOLIC BLOOD PRESSURE: 104 MMHG | HEIGHT: 62 IN

## 2022-01-24 DIAGNOSIS — Z01.419 ENCOUNTER FOR GYNECOLOGICAL EXAMINATION WITHOUT ABNORMAL FINDING: ICD-10-CM

## 2022-01-24 DIAGNOSIS — N95.1 PERIMENOPAUSAL SYMPTOMS: ICD-10-CM

## 2022-01-24 DIAGNOSIS — N93.9 ABNORMAL UTERINE BLEEDING (AUB): ICD-10-CM

## 2022-01-24 DIAGNOSIS — Z12.31 BREAST CANCER SCREENING BY MAMMOGRAM: ICD-10-CM

## 2022-01-24 DIAGNOSIS — Z12.4 SCREENING FOR MALIGNANT NEOPLASM OF THE CERVIX: Primary | ICD-10-CM

## 2022-01-24 DIAGNOSIS — N94.6 DYSMENORRHEA: ICD-10-CM

## 2022-01-24 PROCEDURE — 99396 PREV VISIT EST AGE 40-64: CPT | Performed by: OBSTETRICS & GYNECOLOGY

## 2022-01-24 NOTE — PROGRESS NOTES
"Subjective   Emily Tierney is a 51 y.o. female presents for annual , last pap 01/18/21  neg , [facility] , colonoscopy 06/2021 dr sandra at [facility] diverticula of a colon  ,contraception  vasectomy , heavy periods not interested in ablation or pills. Reports her periods have actually improved since last year as have her perimenopause symptoms. Never tried the HRT samples. Son playing football at [place].        I last saw a year ago for annual.  Patient c/o heavy periods and trouble sleeping , did not take prozac for perimenopause. Son in 8th grade at [employer] - hopes to go to [employer]. Patient reports insomnia as main complaint - also having hot flashes and night sweats.   Discussed tx options including HRT - will consider trying for 8 weeks and then call me to see how she is doing. Discussed risks of increased breast cancer, MI stroke, blood clots and patient expressed understanding.      History of Present Illness    The following portions of the patient's history were reviewed and updated as appropriate: allergies, current medications, past family history, past medical history, past social history, past surgical history and problem list.    Review of Systems   Constitutional: Negative for chills, fatigue and fever.   Gastrointestinal: Negative for abdominal distention and abdominal pain.   Genitourinary: Negative for dyspareunia, dysuria, menstrual problem, pelvic pain, vaginal bleeding, vaginal discharge and vaginal pain.   All other systems reviewed and are negative.  /66   Ht 157.5 cm (62\")   Wt 58.5 kg (129 lb)   LMP 01/14/2022 (Approximate)   Breastfeeding No   BMI 23.59 kg/m²       Objective   Physical Exam  Vitals and nursing note reviewed.   Constitutional:       Appearance: Normal appearance. She is well-developed and normal weight.   Neck:      Thyroid: No thyromegaly.   Pulmonary:      Effort: Pulmonary effort is normal.   Chest:   Breasts:      Right: No mass, " nipple discharge, skin change or tenderness.      Left: No mass, nipple discharge, skin change or tenderness.       Abdominal:      General: There is no distension.      Palpations: Abdomen is soft.      Tenderness: There is no abdominal tenderness.   Genitourinary:     General: Normal vulva.      Exam position: Lithotomy position.      Labia:         Right: No rash or lesion.         Left: No rash or lesion.       Vagina: Normal. No vaginal discharge or bleeding.      Cervix: No friability, lesion or cervical bleeding.      Uterus: Not enlarged and not tender.       Adnexa:         Right: No mass or tenderness.          Left: No mass or tenderness.     Musculoskeletal:         General: Normal range of motion.      Cervical back: Normal range of motion.   Skin:     General: Skin is warm and dry.      Findings: No rash.   Neurological:      Mental Status: She is alert and oriented to person, place, and time.   Psychiatric:         Mood and Affect: Mood normal.         Behavior: Behavior normal.           Assessment/Plan   Diagnoses and all orders for this visit:    1. Screening for malignant neoplasm of the cervix (Primary)  -     IgP, Aptima HPV    2. Encounter for gynecological examination without abnormal finding    3. Abnormal uterine bleeding (AUB)    4. Dysmenorrhea    5. Perimenopausal symptoms    6. Breast cancer screening by mammogram  -     Mammo screening digital tomosynthesis bilateral w CAD; Future      Counseling was given to patient for the following topics: instructions for management, importance of treatment compliance and self-breast exams .   Return in about 1 year (around 1/24/2023) for Annual physical.

## 2022-04-21 NOTE — TELEPHONE ENCOUNTER
Rx Refill Note  Requested Prescriptions     Pending Prescriptions Disp Refills   • losartan (Cozaar) 100 MG tablet 90 tablet 1     Sig: Take 1 tablet by mouth Daily.      Last office visit with prescribing clinician: 5/5/2021      Next office visit with prescribing clinician: Visit date not found       {TIP  Please add Last Relevant Lab Date if appropriate: 10/21/21    Monica Jiang MA  04/21/22, 17:26 EDT

## 2022-04-22 RX ORDER — LOSARTAN POTASSIUM 100 MG/1
100 TABLET ORAL DAILY
Qty: 90 TABLET | Refills: 1 | Status: SHIPPED | OUTPATIENT
Start: 2022-04-22 | End: 2022-08-09

## 2022-05-20 ENCOUNTER — APPOINTMENT (OUTPATIENT)
Dept: WOMENS IMAGING | Facility: HOSPITAL | Age: 52
End: 2022-05-20

## 2022-05-20 PROCEDURE — 77067 SCR MAMMO BI INCL CAD: CPT | Performed by: RADIOLOGY

## 2022-05-20 PROCEDURE — 77063 BREAST TOMOSYNTHESIS BI: CPT | Performed by: RADIOLOGY

## 2022-08-09 RX ORDER — LOSARTAN POTASSIUM 100 MG/1
100 TABLET ORAL DAILY
Qty: 90 TABLET | Refills: 0 | Status: SHIPPED | OUTPATIENT
Start: 2022-08-09 | End: 2022-09-19 | Stop reason: SDUPTHER

## 2022-08-09 NOTE — TELEPHONE ENCOUNTER
Rx Refill Note  Requested Prescriptions     Pending Prescriptions Disp Refills   • losartan (COZAAR) 100 MG tablet [Pharmacy Med Name: LOSARTAN 100MG TABLETS] 90 tablet 1     Sig: TAKE 1 TABLET BY MOUTH DAILY      Last office visit with prescribing clinician: 5/5/2021      Next office visit with prescribing clinician: Visit date not found            Carmen Fitch MA  08/09/22, 11:33 EDT

## 2022-09-19 ENCOUNTER — OFFICE VISIT (OUTPATIENT)
Dept: FAMILY MEDICINE CLINIC | Facility: CLINIC | Age: 52
End: 2022-09-19

## 2022-09-19 VITALS
DIASTOLIC BLOOD PRESSURE: 74 MMHG | WEIGHT: 136.9 LBS | RESPIRATION RATE: 16 BRPM | BODY MASS INDEX: 25.19 KG/M2 | HEIGHT: 62 IN | OXYGEN SATURATION: 98 % | HEART RATE: 78 BPM | SYSTOLIC BLOOD PRESSURE: 124 MMHG | TEMPERATURE: 97.1 F

## 2022-09-19 DIAGNOSIS — E53.8 B12 DEFICIENCY: ICD-10-CM

## 2022-09-19 DIAGNOSIS — E55.9 VITAMIN D DEFICIENCY: ICD-10-CM

## 2022-09-19 DIAGNOSIS — Z13.29 SCREENING FOR THYROID DISORDER: ICD-10-CM

## 2022-09-19 DIAGNOSIS — Z00.00 ANNUAL PHYSICAL EXAM: Primary | ICD-10-CM

## 2022-09-19 DIAGNOSIS — I10 ESSENTIAL HYPERTENSION: ICD-10-CM

## 2022-09-19 DIAGNOSIS — Z13.220 SCREENING FOR LIPOID DISORDERS: ICD-10-CM

## 2022-09-19 DIAGNOSIS — Z13.1 SCREENING FOR DIABETES MELLITUS: ICD-10-CM

## 2022-09-19 PROCEDURE — 99396 PREV VISIT EST AGE 40-64: CPT | Performed by: NURSE PRACTITIONER

## 2022-09-19 RX ORDER — LOSARTAN POTASSIUM 100 MG/1
100 TABLET ORAL DAILY
Qty: 90 TABLET | Refills: 3 | Status: SHIPPED | OUTPATIENT
Start: 2022-09-19

## 2022-09-19 NOTE — PROGRESS NOTES
"Chief Complaint  Annual Exam    Subjective        Emily Tierney presents to [facility]  History of Present Illness  Emily presents for an annual physical exam. She has no acute concerns today. Her bp is well controlled, at 124/74. She is currently taking losartan 100 mg daily. She denies any side effects with current medication.       Objective   Vital Signs:  /74 (BP Location: Right arm, Patient Position: Sitting, Cuff Size: Small Adult)   Pulse 78   Temp 97.1 °F (36.2 °C) (Temporal)   Resp 16   Ht 157.5 cm (62\")   Wt 62.1 kg (136 lb 14.4 oz)   SpO2 98%   BMI 25.04 kg/m²   Estimated body mass index is 25.04 kg/m² as calculated from the following:    Height as of this encounter: 157.5 cm (62\").    Weight as of this encounter: 62.1 kg (136 lb 14.4 oz).          Physical Exam  Vitals reviewed.   Constitutional:       General: She is not in acute distress.     Appearance: She is well-developed and normal weight. She is not diaphoretic.   HENT:      Head: Normocephalic and atraumatic.      Right Ear: Tympanic membrane, ear canal and external ear normal.      Left Ear: Tympanic membrane, ear canal and external ear normal.      Nose: Nose normal.      Mouth/Throat:      Mouth: Mucous membranes are moist.      Pharynx: Oropharynx is clear. Uvula midline. No oropharyngeal exudate.   Eyes:      Conjunctiva/sclera: Conjunctivae normal.      Pupils: Pupils are equal, round, and reactive to light.   Cardiovascular:      Rate and Rhythm: Normal rate and regular rhythm.      Heart sounds: Normal heart sounds. No murmur heard.    No friction rub. No gallop.   Pulmonary:      Effort: Pulmonary effort is normal. No respiratory distress.      Breath sounds: Normal breath sounds. No wheezing or rales.   Abdominal:      General: Bowel sounds are normal. There is no distension.      Palpations: Abdomen is soft.      Tenderness: There is no abdominal tenderness.   Musculoskeletal:    "   Cervical back: Neck supple.   Lymphadenopathy:      Cervical: No cervical adenopathy.   Skin:     General: Skin is warm and dry.   Neurological:      Mental Status: She is alert and oriented to person, place, and time.   Psychiatric:         Mood and Affect: Mood normal.        Result Review :                Assessment and Plan   Diagnoses and all orders for this visit:    1. Annual physical exam (Primary)  -     CBC & Differential  -     Comprehensive Metabolic Panel  -     Lipid Panel With LDL / HDL Ratio  -     TSH Rfx On Abnormal To Free T4    2. Vitamin D deficiency  -     Vitamin D 25 hydroxy    3. B12 deficiency  -     Vitamin B12    4. Screening for thyroid disorder  -     TSH Rfx On Abnormal To Free T4    5. Screening for lipoid disorders  -     Lipid Panel With LDL / HDL Ratio    6. Essential hypertension  -     CBC & Differential  -     Comprehensive Metabolic Panel    7. Screening for diabetes mellitus  -     Hemoglobin A1c    Other orders  -     losartan (COZAAR) 100 MG tablet; Take 1 tablet by mouth Daily.  Dispense: 90 tablet; Refill: 3        Information/counseling provided to the patient regarding periodic kamini maintenance recommendations, including but not limited to immunizations, diet/exercise/healthy lifestyle, laboratory, and other screenings. BMI is discussed. Appropriate exercise, diet, and weight plans are discussed.    Pap/mammogram are up to date  Routine labs today  Will add b12 and d due to previous low levels  Refill given of losartan, BP well controlled  Follow up annually, sooner as needed       Follow Up   No follow-ups on file.  Patient was given instructions and counseling regarding her condition or for health maintenance advice. Please see specific information pulled into the AVS if appropriate.

## 2022-09-20 LAB
25(OH)D3+25(OH)D2 SERPL-MCNC: 34.8 NG/ML (ref 30–100)
ALBUMIN SERPL-MCNC: 4.5 G/DL (ref 3.5–5.2)
ALBUMIN/GLOB SERPL: 2.1 G/DL
ALP SERPL-CCNC: 54 U/L (ref 39–117)
ALT SERPL-CCNC: 19 U/L (ref 1–33)
AST SERPL-CCNC: 14 U/L (ref 1–32)
BASOPHILS # BLD AUTO: 0.04 10*3/MM3 (ref 0–0.2)
BASOPHILS NFR BLD AUTO: 0.6 % (ref 0–1.5)
BILIRUB SERPL-MCNC: 0.2 MG/DL (ref 0–1.2)
BUN SERPL-MCNC: 10 MG/DL (ref 6–20)
BUN/CREAT SERPL: 10.5 (ref 7–25)
CALCIUM SERPL-MCNC: 9.7 MG/DL (ref 8.6–10.5)
CHLORIDE SERPL-SCNC: 103 MMOL/L (ref 98–107)
CHOLEST SERPL-MCNC: 220 MG/DL (ref 0–200)
CO2 SERPL-SCNC: 26.5 MMOL/L (ref 22–29)
CREAT SERPL-MCNC: 0.95 MG/DL (ref 0.57–1)
EGFRCR SERPLBLD CKD-EPI 2021: 73.1 ML/MIN/1.73
EOSINOPHIL # BLD AUTO: 0.06 10*3/MM3 (ref 0–0.4)
EOSINOPHIL NFR BLD AUTO: 1 % (ref 0.3–6.2)
ERYTHROCYTE [DISTWIDTH] IN BLOOD BY AUTOMATED COUNT: 13 % (ref 12.3–15.4)
GLOBULIN SER CALC-MCNC: 2.1 GM/DL
GLUCOSE SERPL-MCNC: 96 MG/DL (ref 65–99)
HBA1C MFR BLD: 5 % (ref 4.8–5.6)
HCT VFR BLD AUTO: 37.2 % (ref 34–46.6)
HDLC SERPL-MCNC: 84 MG/DL (ref 40–60)
HGB BLD-MCNC: 12.6 G/DL (ref 12–15.9)
IMM GRANULOCYTES # BLD AUTO: 0.02 10*3/MM3 (ref 0–0.05)
IMM GRANULOCYTES NFR BLD AUTO: 0.3 % (ref 0–0.5)
LDLC SERPL CALC-MCNC: 101 MG/DL (ref 0–100)
LDLC/HDLC SERPL: 1.12 {RATIO}
LYMPHOCYTES # BLD AUTO: 1.99 10*3/MM3 (ref 0.7–3.1)
LYMPHOCYTES NFR BLD AUTO: 31.6 % (ref 19.6–45.3)
MCH RBC QN AUTO: 33.9 PG (ref 26.6–33)
MCHC RBC AUTO-ENTMCNC: 33.9 G/DL (ref 31.5–35.7)
MCV RBC AUTO: 100 FL (ref 79–97)
MONOCYTES # BLD AUTO: 0.54 10*3/MM3 (ref 0.1–0.9)
MONOCYTES NFR BLD AUTO: 8.6 % (ref 5–12)
NEUTROPHILS # BLD AUTO: 3.65 10*3/MM3 (ref 1.7–7)
NEUTROPHILS NFR BLD AUTO: 57.9 % (ref 42.7–76)
NRBC BLD AUTO-RTO: 0 /100 WBC (ref 0–0.2)
PLATELET # BLD AUTO: 254 10*3/MM3 (ref 140–450)
POTASSIUM SERPL-SCNC: 4.2 MMOL/L (ref 3.5–5.2)
PROT SERPL-MCNC: 6.6 G/DL (ref 6–8.5)
RBC # BLD AUTO: 3.72 10*6/MM3 (ref 3.77–5.28)
SODIUM SERPL-SCNC: 139 MMOL/L (ref 136–145)
TRIGL SERPL-MCNC: 210 MG/DL (ref 0–150)
TSH SERPL DL<=0.005 MIU/L-ACNC: 1.39 UIU/ML (ref 0.27–4.2)
VIT B12 SERPL-MCNC: 221 PG/ML (ref 211–946)
VLDLC SERPL CALC-MCNC: 35 MG/DL (ref 5–40)
WBC # BLD AUTO: 6.3 10*3/MM3 (ref 3.4–10.8)

## 2023-01-30 ENCOUNTER — OFFICE VISIT (OUTPATIENT)
Dept: OBSTETRICS AND GYNECOLOGY | Age: 53
End: 2023-01-30
Payer: COMMERCIAL

## 2023-01-30 VITALS
HEIGHT: 62 IN | WEIGHT: 131 LBS | DIASTOLIC BLOOD PRESSURE: 80 MMHG | SYSTOLIC BLOOD PRESSURE: 124 MMHG | BODY MASS INDEX: 24.11 KG/M2

## 2023-01-30 DIAGNOSIS — Z01.419 ENCOUNTER FOR GYNECOLOGICAL EXAMINATION WITHOUT ABNORMAL FINDING: Primary | ICD-10-CM

## 2023-01-30 DIAGNOSIS — Z13.89 SCREENING FOR BLOOD OR PROTEIN IN URINE: ICD-10-CM

## 2023-01-30 DIAGNOSIS — Z12.31 BREAST CANCER SCREENING BY MAMMOGRAM: ICD-10-CM

## 2023-01-30 DIAGNOSIS — Z12.4 SCREENING FOR MALIGNANT NEOPLASM OF THE CERVIX: ICD-10-CM

## 2023-01-30 LAB
BILIRUB BLD-MCNC: NEGATIVE MG/DL
GLUCOSE UR STRIP-MCNC: NEGATIVE MG/DL
KETONES UR QL: NEGATIVE
LEUKOCYTE EST, POC: NEGATIVE
NITRITE UR-MCNC: NEGATIVE MG/ML
PH UR: 6.5 [PH] (ref 5–8)
PROT UR STRIP-MCNC: NEGATIVE MG/DL
RBC # UR STRIP: NEGATIVE /UL
SP GR UR: 1.01 (ref 1–1.03)
UROBILINOGEN UR QL: NORMAL

## 2023-01-30 PROCEDURE — 99396 PREV VISIT EST AGE 40-64: CPT | Performed by: OBSTETRICS & GYNECOLOGY

## 2023-01-30 PROCEDURE — 81002 URINALYSIS NONAUTO W/O SCOPE: CPT | Performed by: OBSTETRICS & GYNECOLOGY
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
## 2023-03-20 ENCOUNTER — TELEPHONE (OUTPATIENT)
Dept: FAMILY MEDICINE CLINIC | Facility: CLINIC | Age: 53
End: 2023-03-20

## 2023-03-20 NOTE — TELEPHONE ENCOUNTER
Caller: Emily Tierney    Relationship: Self    Best call back number: [phone]    Who are you requesting to speak with (clinical staff, provider,  specific staff member): ANDRE DELEON OR MA    What was the call regarding: PATIENT STATES THAT SHE RECENTLY ENROLLED IN AN IOP PROGRAM, AND THEY INFORMED HER THAT SHE MAY REQUIRE A MEDICAL RELEASE FOR HER PCP IN ORDER TO PARTICIPATE. REQUESTS A CALL BACK TO DISCUSS FURTHER    Do you require a callback: YES

## 2023-03-21 NOTE — TELEPHONE ENCOUNTER
CALLED PT AND INFORMED HER SHE NEEDED AN APPT WITH ANDRE FITZPATRICK. PT IS BEING SEEN TODAY FOR MEDICAL EXAM AND WILL CALL BACK TO SCHEDULE.

## 2023-03-22 RX ORDER — DICYCLOMINE HCL 20 MG
TABLET ORAL
Qty: 90 TABLET | Refills: 3 | OUTPATIENT
Start: 2023-03-22

## 2023-03-27 ENCOUNTER — OFFICE VISIT (OUTPATIENT)
Dept: FAMILY MEDICINE CLINIC | Facility: CLINIC | Age: 53
End: 2023-03-27
Payer: COMMERCIAL

## 2023-03-27 VITALS
TEMPERATURE: 97.3 F | DIASTOLIC BLOOD PRESSURE: 73 MMHG | BODY MASS INDEX: 23.96 KG/M2 | OXYGEN SATURATION: 98 % | SYSTOLIC BLOOD PRESSURE: 115 MMHG | HEART RATE: 84 BPM | HEIGHT: 62 IN

## 2023-03-27 DIAGNOSIS — R05.9 COUGH, UNSPECIFIED TYPE: Primary | ICD-10-CM

## 2023-03-27 LAB
EXPIRATION DATE: NORMAL
FLUAV AG UPPER RESP QL IA.RAPID: NOT DETECTED
FLUBV AG UPPER RESP QL IA.RAPID: NOT DETECTED
INTERNAL CONTROL: NORMAL
Lab: NORMAL
SARS-COV-2 AG UPPER RESP QL IA.RAPID: NOT DETECTED

## 2023-03-27 PROCEDURE — 3078F DIAST BP <80 MM HG: CPT | Performed by: NURSE PRACTITIONER

## 2023-03-27 PROCEDURE — 99213 OFFICE O/P EST LOW 20 MIN: CPT | Performed by: NURSE PRACTITIONER

## 2023-03-27 PROCEDURE — 87428 SARSCOV & INF VIR A&B AG IA: CPT | Performed by: NURSE PRACTITIONER

## 2023-03-27 PROCEDURE — 3074F SYST BP LT 130 MM HG: CPT | Performed by: NURSE PRACTITIONER

## 2023-03-27 PROCEDURE — 1159F MED LIST DOCD IN RCRD: CPT | Performed by: NURSE PRACTITIONER

## 2023-03-27 PROCEDURE — 1160F RVW MEDS BY RX/DR IN RCRD: CPT | Performed by: NURSE PRACTITIONER

## 2023-03-27 NOTE — PROGRESS NOTES
"Chief Complaint  Nasal Congestion (C/o nasal congestion, cough, sinus pressure headaches, no fever x 2-3 days )    Subjective        Emily Tierney presents to [facility]  History of Present Illness new pt to me.  Here for cough, congestion, sinus pressure for 2-3 days.  Has no fever, chills. No known sick contacts.  No ear pain, significant sore throat.  No diarrhea, vomiting. Starting to improve.     Typically needs abx for sinusitis as it tends to linger.  Sx usually resolve as soon as abx started.  Going on spring break and wants to make sure not ill.       Objective   Vital Signs:  /73   Pulse 84   Temp 97.3 °F (36.3 °C)   Ht 157.5 cm (62\")   SpO2 98%   BMI 23.96 kg/m²   Estimated body mass index is 23.96 kg/m² as calculated from the following:    Height as of this encounter: 157.5 cm (62\").    Weight as of 1/30/23: 59.4 kg (131 lb).       BMI is within normal parameters. No other follow-up for BMI required.      Physical Exam  Vitals and nursing note reviewed.   Constitutional:       General: She is not in acute distress.     Appearance: She is well-developed. She is not ill-appearing or diaphoretic.   HENT:      Head: Normocephalic and atraumatic.      Right Ear: Tympanic membrane, ear canal and external ear normal.      Left Ear: Tympanic membrane, ear canal and external ear normal.      Mouth/Throat:      Mouth: Mucous membranes are moist.      Pharynx: No posterior oropharyngeal erythema.   Eyes:      General:         Right eye: No discharge.         Left eye: No discharge.      Conjunctiva/sclera: Conjunctivae normal.   Cardiovascular:      Rate and Rhythm: Normal rate and regular rhythm.      Heart sounds: Normal heart sounds.   Pulmonary:      Effort: Pulmonary effort is normal.      Breath sounds: Normal breath sounds. No wheezing or rales.   Abdominal:      General: Bowel sounds are normal.      Palpations: Abdomen is soft.      Tenderness: There is no " abdominal tenderness.   Musculoskeletal:         General: No deformity.      Cervical back: Neck supple.      Comments: Gait smooth and steady   Lymphadenopathy:      Cervical: No cervical adenopathy.   Skin:     General: Skin is warm and dry.   Neurological:      Mental Status: She is alert and oriented to person, place, and time.   Psychiatric:         Mood and Affect: Mood normal.         Behavior: Behavior normal.        Result Review :                   Assessment and Plan   Diagnoses and all orders for this visit:    1. Cough, unspecified type (Primary)  -     POCT SARS-CoV-2 Antigen ANGELICA    flu/covid negative. Recommend OTC sudafed, good allergy control.  Childrens dimetapp for cough cold tend to work well.  I dont think abx indicated this early in illness and should be able to clear it up with OTC meds.  If not, let us know.          Follow Up   Return if symptoms worsen or fail to improve.  Patient was given instructions and counseling regarding her condition or for health maintenance advice. Please see specific information pulled into the AVS if appropriate.

## 2023-05-22 ENCOUNTER — APPOINTMENT (OUTPATIENT)
Dept: WOMENS IMAGING | Facility: HOSPITAL | Age: 53
End: 2023-05-22

## 2023-05-22 PROCEDURE — 77067 SCR MAMMO BI INCL CAD: CPT | Performed by: RADIOLOGY

## 2023-05-22 PROCEDURE — 77063 BREAST TOMOSYNTHESIS BI: CPT | Performed by: RADIOLOGY

## 2023-09-27 ENCOUNTER — OFFICE VISIT (OUTPATIENT)
Dept: FAMILY MEDICINE CLINIC | Facility: CLINIC | Age: 53
End: 2023-09-27
Payer: COMMERCIAL

## 2023-09-27 VITALS
OXYGEN SATURATION: 95 % | TEMPERATURE: 97.8 F | WEIGHT: 137.8 LBS | HEIGHT: 62 IN | DIASTOLIC BLOOD PRESSURE: 78 MMHG | SYSTOLIC BLOOD PRESSURE: 138 MMHG | BODY MASS INDEX: 25.36 KG/M2 | RESPIRATION RATE: 18 BRPM | HEART RATE: 84 BPM

## 2023-09-27 DIAGNOSIS — Z82.49 FAMILY HISTORY OF HEART DISEASE: ICD-10-CM

## 2023-09-27 DIAGNOSIS — I10 ESSENTIAL HYPERTENSION: ICD-10-CM

## 2023-09-27 DIAGNOSIS — Z13.29 SCREENING FOR THYROID DISORDER: ICD-10-CM

## 2023-09-27 DIAGNOSIS — Z00.00 ANNUAL PHYSICAL EXAM: Primary | ICD-10-CM

## 2023-09-27 DIAGNOSIS — Z13.220 SCREENING FOR LIPOID DISORDERS: ICD-10-CM

## 2023-09-27 PROCEDURE — 3078F DIAST BP <80 MM HG: CPT | Performed by: NURSE PRACTITIONER

## 2023-09-27 PROCEDURE — 3075F SYST BP GE 130 - 139MM HG: CPT | Performed by: NURSE PRACTITIONER

## 2023-09-27 PROCEDURE — 99396 PREV VISIT EST AGE 40-64: CPT | Performed by: NURSE PRACTITIONER

## 2023-09-27 RX ORDER — LOSARTAN POTASSIUM 100 MG/1
100 TABLET ORAL DAILY
Qty: 90 TABLET | Refills: 3 | Status: SHIPPED | OUTPATIENT
Start: 2023-09-27

## 2023-09-27 NOTE — PROGRESS NOTES
"Chief Complaint  Hypertension    Subjective        Emily Tierney presents to [facility]  History of Present Illness    Emily Tierney is a 52-year-old female who presents today for an annual physical examination and medication refills.    She has hearing loss in her right ear. She is establishing with [facility]. She reports seeing an ENT who recommended a cochlear implant and she wanted a second opinion.    She has a family history of heart disease. She is interested in a calcium scoring test as well as establishing with a cardiologist. Her BP is elevated in office today, however she reports normal readings at home.     Her mammogram and Pap smear are up to date. She follows gynecologist Dr. Maddi Coulter. She does not normally get the influenza vaccine.          Objective   Vital Signs:  /78 (BP Location: Left arm, Patient Position: Sitting, Cuff Size: Adult)   Pulse 84   Temp 97.8 °F (36.6 °C) (Temporal)   Resp 18   Ht 157.5 cm (62.01\")   Wt 62.5 kg (137 lb 12.8 oz)   SpO2 95%   BMI 25.20 kg/m²   Estimated body mass index is 25.2 kg/m² as calculated from the following:    Height as of this encounter: 157.5 cm (62.01\").    Weight as of this encounter: 62.5 kg (137 lb 12.8 oz).          Physical Exam  Vitals reviewed.   Constitutional:       Appearance: Normal appearance. She is well-developed and normal weight.   HENT:      Right Ear: Tympanic membrane and ear canal normal.      Left Ear: Tympanic membrane and ear canal normal.      Nose: Nose normal.      Mouth/Throat:      Mouth: Mucous membranes are moist.      Pharynx: Oropharynx is clear.   Eyes:      Conjunctiva/sclera: Conjunctivae normal.      Pupils: Pupils are equal, round, and reactive to light.   Cardiovascular:      Rate and Rhythm: Normal rate and regular rhythm.      Heart sounds: Normal heart sounds. No murmur heard.    No friction rub. No gallop.   Pulmonary:      Effort: " Pulmonary effort is normal. No respiratory distress.      Breath sounds: Normal breath sounds. No wheezing or rales.   Abdominal:      General: Bowel sounds are normal. There is no distension.      Palpations: Abdomen is soft.      Tenderness: There is no abdominal tenderness.   Musculoskeletal:      Cervical back: Neck supple.   Lymphadenopathy:      Cervical: No cervical adenopathy.   Skin:     General: Skin is warm and dry.   Neurological:      Mental Status: She is alert and oriented to person, place, and time.   Psychiatric:         Mood and Affect: Mood normal.        Result Review :  No new results were reviewed or obtained today.                Assessment and Plan   Diagnoses and all orders for this visit:    1. Annual physical exam (Primary)  -     Hemoglobin A1c  -     CBC & Differential  -     Comprehensive Metabolic Panel  -     Lipid Panel With LDL / HDL Ratio  -     TSH Rfx On Abnormal To Free T4    2. Screening for thyroid disorder  -     TSH Rfx On Abnormal To Free T4    3. Screening for lipoid disorders  -     Lipid Panel With LDL / HDL Ratio    4. Essential hypertension  -     CBC & Differential  -     Comprehensive Metabolic Panel  -     Ambulatory Referral to Cardiology    5. Family history of heart disease  -     CT Cardiac Calcium Score Without Dye; Future  -     Ambulatory Referral to Cardiology    Other orders  -     losartan (COZAAR) 100 MG tablet; Take 1 tablet by mouth Daily.  Dispense: 90 tablet; Refill: 3      Information/counseling provided to the patient regarding periodic kamini maintenance recommendations, including but not limited to immunizations, diet/exercise/healthy lifestyle, laboratory, and other screenings. BMI is discussed. Appropriate exercise, diet, and weight plans are discussed.      1. Annual physical examination  She will complete a full panel of laboratory studies to include CBC & differential, CMP, lipid panel, TSH reflex on abnormal to free T4, and hemoglobin A1c.      2. Essential hypertension  Her blood pressure was elevated to 138/78 mmHg this morning. It, however, does not normally run high. We will continue to monitor. She will continue losartan 100 mg once daily. A prescription refill for losartan (Cozaar) 100 mg tablet has been sent to her preferred pharmacy of [facility]. She will complete lab work to include CMP and CBC and differential.     3. Family history of heart disease  An order has been placed for CT cardiac calcium score. An ambulatory referral has been placed to cardiology.    4. Screening for thyroid disorder  She will complete lab work to include TSH reflex on abnormal to free T4.     5. Screening for lipoid disorders  She will complete lab work to include lipid panel.          Follow Up   No follow-ups on file.  Patient was given instructions and counseling regarding her condition or for health maintenance advice. Please see specific information pulled into the AVS if appropriate.       Transcribed from ambient dictation for AMARI Edwards by Sandi Heck.  09/27/23   13:51 EDT    Patient or patient representative verbalized consent to the visit recording.  I have personally performed the services described in this document as transcribed by the above individual, and it is both accurate and complete.

## 2023-09-28 ENCOUNTER — TELEPHONE (OUTPATIENT)
Dept: FAMILY MEDICINE CLINIC | Facility: CLINIC | Age: 53
End: 2023-09-28

## 2023-09-28 LAB
ALBUMIN SERPL-MCNC: 4.9 G/DL (ref 3.5–5.2)
ALBUMIN/GLOB SERPL: 2 G/DL
ALP SERPL-CCNC: 64 U/L (ref 39–117)
ALT SERPL-CCNC: 24 U/L (ref 1–33)
AST SERPL-CCNC: 27 U/L (ref 1–32)
BASOPHILS # BLD AUTO: 0.03 10*3/MM3 (ref 0–0.2)
BASOPHILS NFR BLD AUTO: 0.6 % (ref 0–1.5)
BILIRUB SERPL-MCNC: 0.3 MG/DL (ref 0–1.2)
BUN SERPL-MCNC: 11 MG/DL (ref 6–20)
BUN/CREAT SERPL: 18.3 (ref 7–25)
CALCIUM SERPL-MCNC: 9.8 MG/DL (ref 8.6–10.5)
CHLORIDE SERPL-SCNC: 101 MMOL/L (ref 98–107)
CHOLEST SERPL-MCNC: 294 MG/DL (ref 0–200)
CO2 SERPL-SCNC: 24.3 MMOL/L (ref 22–29)
CREAT SERPL-MCNC: 0.6 MG/DL (ref 0.57–1)
EGFRCR SERPLBLD CKD-EPI 2021: 108.8 ML/MIN/1.73
EOSINOPHIL # BLD AUTO: 0.14 10*3/MM3 (ref 0–0.4)
EOSINOPHIL NFR BLD AUTO: 2.6 % (ref 0.3–6.2)
ERYTHROCYTE [DISTWIDTH] IN BLOOD BY AUTOMATED COUNT: 12.6 % (ref 12.3–15.4)
GLOBULIN SER CALC-MCNC: 2.4 GM/DL
GLUCOSE SERPL-MCNC: 91 MG/DL (ref 65–99)
HBA1C MFR BLD: 5.2 % (ref 4.8–5.6)
HCT VFR BLD AUTO: 37.5 % (ref 34–46.6)
HDLC SERPL-MCNC: 96 MG/DL (ref 40–60)
HGB BLD-MCNC: 13 G/DL (ref 12–15.9)
IMM GRANULOCYTES # BLD AUTO: 0.02 10*3/MM3 (ref 0–0.05)
IMM GRANULOCYTES NFR BLD AUTO: 0.4 % (ref 0–0.5)
LDLC SERPL CALC-MCNC: 139 MG/DL (ref 0–100)
LDLC/HDLC SERPL: 1.37 {RATIO}
LYMPHOCYTES # BLD AUTO: 1.76 10*3/MM3 (ref 0.7–3.1)
LYMPHOCYTES NFR BLD AUTO: 32.5 % (ref 19.6–45.3)
MCH RBC QN AUTO: 34.5 PG (ref 26.6–33)
MCHC RBC AUTO-ENTMCNC: 34.7 G/DL (ref 31.5–35.7)
MCV RBC AUTO: 99.5 FL (ref 79–97)
MONOCYTES # BLD AUTO: 0.47 10*3/MM3 (ref 0.1–0.9)
MONOCYTES NFR BLD AUTO: 8.7 % (ref 5–12)
NEUTROPHILS # BLD AUTO: 3 10*3/MM3 (ref 1.7–7)
NEUTROPHILS NFR BLD AUTO: 55.2 % (ref 42.7–76)
NRBC BLD AUTO-RTO: 0 /100 WBC (ref 0–0.2)
PLATELET # BLD AUTO: 259 10*3/MM3 (ref 140–450)
POTASSIUM SERPL-SCNC: 4.4 MMOL/L (ref 3.5–5.2)
PROT SERPL-MCNC: 7.3 G/DL (ref 6–8.5)
RBC # BLD AUTO: 3.77 10*6/MM3 (ref 3.77–5.28)
SODIUM SERPL-SCNC: 137 MMOL/L (ref 136–145)
TRIGL SERPL-MCNC: 333 MG/DL (ref 0–150)
TSH SERPL DL<=0.005 MIU/L-ACNC: 1.52 UIU/ML (ref 0.27–4.2)
VLDLC SERPL CALC-MCNC: 59 MG/DL (ref 5–40)
WBC # BLD AUTO: 5.42 10*3/MM3 (ref 3.4–10.8)

## 2023-09-28 NOTE — TELEPHONE ENCOUNTER
Hub staff attempted to follow warm transfer process and was unsuccessful     Caller: Emily Tierney    Relationship to patient: Self    Best call back number: [phone]     Patient is needing: RETURNING PHONE FOR LAB RESULTS

## 2023-10-10 ENCOUNTER — HOSPITAL ENCOUNTER (OUTPATIENT)
Dept: CT IMAGING | Facility: HOSPITAL | Age: 53
Discharge: HOME | End: 2023-10-10
Admitting: NURSE PRACTITIONER

## 2023-10-10 DIAGNOSIS — Z82.49 FAMILY HISTORY OF HEART DISEASE: ICD-10-CM

## 2023-10-10 PROCEDURE — 75571 CT HRT W/O DYE W/CA TEST: CPT

## 2023-10-24 RX ORDER — DICYCLOMINE HCL 20 MG
TABLET ORAL
Qty: 90 TABLET | Refills: 3 | OUTPATIENT
Start: 2023-10-24

## 2023-10-25 ENCOUNTER — OFFICE VISIT (OUTPATIENT)
Dept: CARDIOLOGY | Facility: CLINIC | Age: 53
End: 2023-10-25
Payer: COMMERCIAL

## 2023-10-25 VITALS
HEART RATE: 84 BPM | BODY MASS INDEX: 25.32 KG/M2 | HEIGHT: 62 IN | WEIGHT: 137.6 LBS | DIASTOLIC BLOOD PRESSURE: 80 MMHG | SYSTOLIC BLOOD PRESSURE: 126 MMHG

## 2023-10-25 DIAGNOSIS — I10 ESSENTIAL HYPERTENSION: ICD-10-CM

## 2023-10-25 DIAGNOSIS — Z82.49 FAMILY HISTORY OF CORONARY ARTERY DISEASE: Primary | ICD-10-CM

## 2023-10-25 DIAGNOSIS — E78.2 MIXED HYPERLIPIDEMIA: ICD-10-CM

## 2023-10-25 RX ORDER — FEXOFENADINE HCL 180 MG/1
180 TABLET ORAL DAILY
COMMUNITY

## 2023-10-25 NOTE — PROGRESS NOTES
[facility]      Patient Name: Emily Tierney  :1970  Age: 52 y.o.  Encounter Provider:  Juan Leigh Jr, MD      Chief Complaint:   Chief Complaint   Patient presents with     Family history of heart disease    Essential hypertension         HPI  Emily Tierney is a 52 y.o. female past medical history of mixed hyperlipidemia and family history of coronary artery disease presents for initial evaluation.  Patient does not exercise often but needs to perform a significant amount of physical activity at work.  She denies any chest pain or shortness of air with activity.  No orthopnea, PND or edema.  Occasional palpitations but no dizziness or syncope.  She states she had a murmur as a child but that she did not require follow-up with a cardiologist.  Her mother had multiple PCI and CABG at advanced age.  Father history of thoracic aortic aneurysm.  She is an ex-smoker quit in 2016, drinks socially and denies illicit drug use.  He had a coronary calcium score of 0 prior to this visit.  Cholesterol profile is grossly abnormal with total cholesterol 294, triglyceride 333, HDL 96 and LDL of 139.      The following portions of the patient's history were reviewed and updated as appropriate: allergies, current medications, past family history, past medical history, past social history, past surgical history and problem list.      Review of Systems   Constitutional: Negative for chills and fever.   HENT:  Negative for hoarse voice and sore throat.    Eyes:  Negative for double vision and photophobia.   Cardiovascular:  Positive for palpitations. Negative for chest pain, leg swelling, near-syncope, orthopnea, paroxysmal nocturnal dyspnea and syncope.   Respiratory:  Negative for cough and wheezing.    Skin:  Negative for poor wound healing and rash.   Musculoskeletal:  Negative for arthritis and joint swelling.   Gastrointestinal:  Negative for bloating, abdominal pain, hematemesis  "and hematochezia.   Neurological:  Negative for dizziness and focal weakness.   Psychiatric/Behavioral:  Negative for depression and suicidal ideas.      OBJECTIVE:   Vital Signs  Vitals:    10/25/23 0936   BP: 126/80   Pulse: 84     Estimated body mass index is 25.17 kg/m² as calculated from the following:    Height as of this encounter: 157.5 cm (62\").    Weight as of this encounter: 62.4 kg (137 lb 9.6 oz).    Vitals reviewed.   Constitutional:       Appearance: Healthy appearance. Not in distress.   Neck:      Vascular: No JVR. JVD normal.   Pulmonary:      Effort: Pulmonary effort is normal.      Breath sounds: Normal breath sounds. No wheezing. No rhonchi. No rales.   Chest:      Chest wall: Not tender to palpatation.   Cardiovascular:      PMI at left midclavicular line. Normal rate. Regular rhythm. Normal S1. Normal S2.       Murmurs: There is no murmur.      No gallop.  No click. No rub.   Pulses:     Intact distal pulses.   Edema:     Peripheral edema absent.   Abdominal:      General: Bowel sounds are normal.      Palpations: Abdomen is soft.      Tenderness: There is no abdominal tenderness.   Musculoskeletal: Normal range of motion.         General: No tenderness. Skin:     General: Skin is warm and dry.   Neurological:      General: No focal deficit present.      Mental Status: Alert and oriented to person, place and time.       ECG 12 Lead    Date/Time: 10/25/2023 9:44 AM  Performed by: Juan Leigh Jr., MD    Authorized by: Juan Leigh Jr., MD  Comparison: compared with previous ECG from 2/25/2020  Similar to previous ECG  Rhythm: sinus rhythm    Clinical impression: normal ECG      Lipid Panel          9/27/2023    11:01   Lipid Panel   Total Cholesterol 294    Triglycerides 333    HDL Cholesterol 96    VLDL Cholesterol 59    LDL Cholesterol  139    LDL/HDL Ratio 1.37         BUN   Date Value Ref Range Status   09/27/2023 11 6 - 20 mg/dL Final     Creatinine   Date Value Ref Range Status "   09/27/2023 0.60 0.57 - 1.00 mg/dL Final     Potassium   Date Value Ref Range Status   09/27/2023 4.4 3.5 - 5.2 mmol/L Final     ALT (SGPT)   Date Value Ref Range Status   09/27/2023 24 1 - 33 U/L Final     AST (SGOT)   Date Value Ref Range Status   09/27/2023 27 1 - 32 U/L Final           ASSESSMENT:     52-year-old female with past medical history of mixed hyperlipidemia presents for initial evaluation      PLAN OF CARE:     Mixed hyperlipidemia -although there is a favorable ratio of HDL to non-HDL cholesterol there is clear indication for statin therapy.  Patient does not want to be on medications at this time.  We had a long discussion about the impact of exercise and diet.  She would like to give it a try for 6 months and repeat lipid profile at next visit.  Family history coronary artery disease -calcium score of 0 is encouraging.  Exercise and diet as discussed above.    Return to clinic 6 months             Discharge Medications            Accurate as of October 25, 2023  9:44 AM. If you have any questions, ask your nurse or doctor.                Continue These Medications        Instructions Start Date   dicyclomine 20 MG tablet  Commonly known as: BENTYL   20 mg, Oral, 3 Times Daily PRN      fexofenadine 180 MG tablet  Commonly known as: ALLEGRA   180 mg, Oral, Daily      fluticasone 50 MCG/ACT nasal spray  Commonly known as: FLONASE   2 sprays, Nasal, Daily      losartan 100 MG tablet  Commonly known as: COZAAR   100 mg, Oral, Daily               Thank you for allowing me to participate in the care of your patient,      Sincerely,   Juan Leigh MD  [facility]  10/25/23  09:44 EDT

## 2024-03-25 ENCOUNTER — OFFICE VISIT (OUTPATIENT)
Dept: OBSTETRICS AND GYNECOLOGY | Age: 54
End: 2024-03-25
Payer: COMMERCIAL

## 2024-03-25 VITALS
SYSTOLIC BLOOD PRESSURE: 140 MMHG | BODY MASS INDEX: 25.21 KG/M2 | HEIGHT: 62 IN | WEIGHT: 137 LBS | DIASTOLIC BLOOD PRESSURE: 82 MMHG

## 2024-03-25 DIAGNOSIS — Z01.419 ENCOUNTER FOR GYNECOLOGICAL EXAMINATION WITHOUT ABNORMAL FINDING: Primary | ICD-10-CM

## 2024-03-25 DIAGNOSIS — N95.1 PERIMENOPAUSAL SYMPTOMS: ICD-10-CM

## 2024-03-25 DIAGNOSIS — Z12.4 SCREENING FOR MALIGNANT NEOPLASM OF THE CERVIX: ICD-10-CM

## 2024-03-25 PROCEDURE — 99396 PREV VISIT EST AGE 40-64: CPT | Performed by: OBSTETRICS & GYNECOLOGY

## 2024-03-25 PROCEDURE — 99459 PELVIC EXAMINATION: CPT | Performed by: OBSTETRICS & GYNECOLOGY

## 2024-03-25 PROCEDURE — 3079F DIAST BP 80-89 MM HG: CPT | Performed by: OBSTETRICS & GYNECOLOGY

## 2024-03-25 PROCEDURE — 1159F MED LIST DOCD IN RCRD: CPT | Performed by: OBSTETRICS & GYNECOLOGY

## 2024-03-25 PROCEDURE — 1160F RVW MEDS BY RX/DR IN RCRD: CPT | Performed by: OBSTETRICS & GYNECOLOGY

## 2024-03-25 PROCEDURE — 3077F SYST BP >= 140 MM HG: CPT | Performed by: OBSTETRICS & GYNECOLOGY
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
## 2024-03-28 LAB
CYTOLOGIST CVX/VAG CYTO: NORMAL
CYTOLOGY CVX/VAG DOC CYTO: NORMAL
CYTOLOGY CVX/VAG DOC THIN PREP: NORMAL
DX ICD CODE: NORMAL
HPV I/H RISK 4 DNA CVX QL PROBE+SIG AMP: NEGATIVE
Lab: NORMAL
OTHER STN SPEC: NORMAL
STAT OF ADQ CVX/VAG CYTO-IMP: NORMAL

## 2024-03-29 ENCOUNTER — TELEPHONE (OUTPATIENT)
Dept: OBSTETRICS AND GYNECOLOGY | Age: 54
End: 2024-03-29
Payer: COMMERCIAL

## 2024-04-08 ENCOUNTER — TELEPHONE (OUTPATIENT)
Dept: GASTROENTEROLOGY | Facility: CLINIC | Age: 54
End: 2024-04-08
Payer: COMMERCIAL

## 2024-04-08 ENCOUNTER — TELEPHONE (OUTPATIENT)
Dept: GASTROENTEROLOGY | Facility: CLINIC | Age: 54
End: 2024-04-08

## 2024-04-08 RX ORDER — DICYCLOMINE HCL 20 MG
20 TABLET ORAL 3 TIMES DAILY PRN
Qty: 90 TABLET | Refills: 5 | Status: SHIPPED | OUTPATIENT
Start: 2024-04-08

## 2024-04-08 NOTE — TELEPHONE ENCOUNTER
Provider: LUIS ENRIQUE DUNN MD     Caller: HECTOR FERNANDEZ     Relationship to Patient: SELF     Phone Number: [phone]    Reason for Call: PT STATED THAT SHE HAD A COLONOSCOPY IN 2021 AND SHE IS SUPPOSED TO HAVE ONE EVERY 3 YEARS AND SHE IS DUE THIS YEAR HER RECALL DATE IS 2026 PLEASE ADVISE AND CALL PT BACK

## 2024-04-30 ENCOUNTER — LAB (OUTPATIENT)
Dept: LAB | Facility: HOSPITAL | Age: 54
End: 2024-04-30
Payer: COMMERCIAL

## 2024-04-30 ENCOUNTER — OFFICE VISIT (OUTPATIENT)
Dept: CARDIOLOGY | Facility: CLINIC | Age: 54
End: 2024-04-30
Payer: COMMERCIAL

## 2024-04-30 VITALS
DIASTOLIC BLOOD PRESSURE: 72 MMHG | BODY MASS INDEX: 25.14 KG/M2 | HEIGHT: 62 IN | WEIGHT: 136.6 LBS | OXYGEN SATURATION: 98 % | HEART RATE: 79 BPM | SYSTOLIC BLOOD PRESSURE: 125 MMHG

## 2024-04-30 DIAGNOSIS — E78.2 MIXED HYPERLIPIDEMIA: ICD-10-CM

## 2024-04-30 DIAGNOSIS — E78.2 MIXED HYPERLIPIDEMIA: Primary | ICD-10-CM

## 2024-04-30 LAB
CHOLEST SERPL-MCNC: 312 MG/DL (ref 0–200)
HDLC SERPL-MCNC: 89 MG/DL (ref 40–60)
LDLC SERPL CALC-MCNC: 179 MG/DL (ref 0–100)
LDLC/HDLC SERPL: 1.97 {RATIO}
TRIGL SERPL-MCNC: 237 MG/DL (ref 0–150)
VLDLC SERPL-MCNC: 44 MG/DL (ref 5–40)

## 2024-04-30 PROCEDURE — 1159F MED LIST DOCD IN RCRD: CPT | Performed by: INTERNAL MEDICINE

## 2024-04-30 PROCEDURE — 36415 COLL VENOUS BLD VENIPUNCTURE: CPT

## 2024-04-30 PROCEDURE — 3074F SYST BP LT 130 MM HG: CPT | Performed by: INTERNAL MEDICINE

## 2024-04-30 PROCEDURE — 99214 OFFICE O/P EST MOD 30 MIN: CPT | Performed by: INTERNAL MEDICINE

## 2024-04-30 PROCEDURE — 80061 LIPID PANEL: CPT

## 2024-04-30 PROCEDURE — 1160F RVW MEDS BY RX/DR IN RCRD: CPT | Performed by: INTERNAL MEDICINE

## 2024-04-30 PROCEDURE — 3078F DIAST BP <80 MM HG: CPT | Performed by: INTERNAL MEDICINE

## 2024-04-30 RX ORDER — ATORVASTATIN CALCIUM 40 MG/1
40 TABLET, FILM COATED ORAL DAILY
Qty: 30 TABLET | Refills: 11 | Status: SHIPPED | OUTPATIENT
Start: 2024-04-30

## 2024-04-30 NOTE — PROGRESS NOTES
[facility]      Patient Name: Emily Tierney  :1970  Age: 53 y.o.  Encounter Provider:  Juan Leigh Jr, MD      Chief Complaint:   Chief Complaint   Patient presents with    Follow-up         HPI  Emily Tierney is a 53 y.o. female past medical history of mixed hyperlipidemia and family history of coronary artery disease presents for follow-up evaluation.      Last clinic visit note: Patient does not exercise often but needs to perform a significant amount of physical activity at work.  She denies any chest pain or shortness of air with activity.  No orthopnea, PND or edema.  Occasional palpitations but no dizziness or syncope.  She states she had a murmur as a child but that she did not require follow-up with a cardiologist.  Her mother had multiple PCI and CABG at advanced age.  Father history of thoracic aortic aneurysm.  She is an ex-smoker quit in 2016, drinks socially and denies illicit drug use.  He had a coronary calcium score of 0 prior to this visit.  Cholesterol profile is grossly abnormal with total cholesterol 294, triglyceride 333, HDL 96 and LDL of 139.    Cholesterol worse since last visit.  She is doing is much as she can with diet and exercise.  Blood pressure and heart rate are well-controlled.  No cardiac complaints at time of interview.    The following portions of the patient's history were reviewed and updated as appropriate: allergies, current medications, past family history, past medical history, past social history, past surgical history and problem list.      Review of Systems   Constitutional: Negative for chills and fever.   HENT:  Negative for hoarse voice and sore throat.    Eyes:  Negative for double vision and photophobia.   Cardiovascular:  Positive for palpitations. Negative for chest pain, leg swelling, near-syncope, orthopnea, paroxysmal nocturnal dyspnea and syncope.   Respiratory:  Negative for cough and wheezing.    Skin:   "Negative for poor wound healing and rash.   Musculoskeletal:  Negative for arthritis and joint swelling.   Gastrointestinal:  Negative for bloating, abdominal pain, hematemesis and hematochezia.   Neurological:  Negative for dizziness and focal weakness.   Psychiatric/Behavioral:  Negative for depression and suicidal ideas.        OBJECTIVE:   Vital Signs  Vitals:    04/30/24 1057   BP: 125/72   Pulse: 79   SpO2: 98%     Estimated body mass index is 24.98 kg/m² as calculated from the following:    Height as of this encounter: 157.5 cm (62\").    Weight as of this encounter: 62 kg (136 lb 9.6 oz).    Vitals reviewed.   Constitutional:       Appearance: Healthy appearance. Not in distress.   Neck:      Vascular: No JVR. JVD normal.   Pulmonary:      Effort: Pulmonary effort is normal.      Breath sounds: Normal breath sounds. No wheezing. No rhonchi. No rales.   Chest:      Chest wall: Not tender to palpatation.   Cardiovascular:      PMI at left midclavicular line. Normal rate. Regular rhythm. Normal S1. Normal S2.       Murmurs: There is no murmur.      No gallop.  No click. No rub.   Pulses:     Intact distal pulses.   Edema:     Peripheral edema absent.   Abdominal:      General: Bowel sounds are normal.      Palpations: Abdomen is soft.      Tenderness: There is no abdominal tenderness.   Musculoskeletal: Normal range of motion.         General: No tenderness. Skin:     General: Skin is warm and dry.   Neurological:      General: No focal deficit present.      Mental Status: Alert and oriented to person, place and time.       Procedures  Lipid Panel          9/27/2023    11:01 4/30/2024    09:58   Lipid Panel   Total Cholesterol  312    Total Cholesterol 294     Triglycerides 333  237    HDL Cholesterol 96  89    VLDL Cholesterol 59  44    LDL Cholesterol  139  179    LDL/HDL Ratio 1.37  1.97         BUN   Date Value Ref Range Status   09/27/2023 11 6 - 20 mg/dL Final     Creatinine   Date Value Ref Range Status "   09/27/2023 0.60 0.57 - 1.00 mg/dL Final     Potassium   Date Value Ref Range Status   09/27/2023 4.4 3.5 - 5.2 mmol/L Final     ALT (SGPT)   Date Value Ref Range Status   09/27/2023 24 1 - 33 U/L Final     AST (SGOT)   Date Value Ref Range Status   09/27/2023 27 1 - 32 U/L Final           ASSESSMENT:     52-year-old female with past medical history of mixed hyperlipidemia presents for initial evaluation      PLAN OF CARE:     Mixed hyperlipidemia -we had a long and candid discussion about initiation of statin therapy as I feel that this is almost certainly representative for familial hyperlipidemia.  She is willing to start atorvastatin 40 mg.  I will have her back in clinic in 6 months to recheck labs.  Family history coronary artery disease -calcium score of 0 is encouraging.  Exercise and diet as counseled.    Return to clinic 6 months             Discharge Medications            Accurate as of April 30, 2024 11:27 AM. If you have any questions, ask your nurse or doctor.                Continue These Medications        Instructions Start Date   dicyclomine 20 MG tablet  Commonly known as: BENTYL   20 mg, Oral, 3 Times Daily PRN      fexofenadine 180 MG tablet  Commonly known as: ALLEGRA   180 mg, Oral, Daily      fluticasone 50 MCG/ACT nasal spray  Commonly known as: FLONASE   2 sprays, Nasal, Daily      losartan 100 MG tablet  Commonly known as: COZAAR   100 mg, Oral, Daily               Thank you for allowing me to participate in the care of your patient,      Sincerely,   Juan Leigh MD  [facility]  04/30/24  11:27 EDT

## 2024-05-15 ENCOUNTER — TELEPHONE (OUTPATIENT)
Dept: GASTROENTEROLOGY | Facility: CLINIC | Age: 54
End: 2024-05-15
Payer: COMMERCIAL

## 2024-05-15 ENCOUNTER — OFFICE VISIT (OUTPATIENT)
Dept: GASTROENTEROLOGY | Facility: CLINIC | Age: 54
End: 2024-05-15
Payer: COMMERCIAL

## 2024-05-15 VITALS
BODY MASS INDEX: 26.09 KG/M2 | HEIGHT: 62 IN | SYSTOLIC BLOOD PRESSURE: 131 MMHG | HEART RATE: 71 BPM | WEIGHT: 141.8 LBS | DIASTOLIC BLOOD PRESSURE: 86 MMHG | TEMPERATURE: 98 F

## 2024-05-15 DIAGNOSIS — R10.13 DYSPEPSIA: ICD-10-CM

## 2024-05-15 DIAGNOSIS — K58.0 IRRITABLE BOWEL SYNDROME WITH DIARRHEA: Primary | ICD-10-CM

## 2024-05-15 DIAGNOSIS — R10.13 EPIGASTRIC DISCOMFORT: ICD-10-CM

## 2024-05-15 NOTE — PROGRESS NOTES
"Chief Complaint  Irritable Bowel Syndrome, Diarrhea, and Abdominal Pain    Subjective          History Of Present Illness:    Emily Tierney is a  53 y.o. female patient of Dr. Fowler who presents as a follow-up for irritable bowel syndrome with diarrhea and GERD.  Patient does have a family history of colon cancer and colon polyps.    Patient reports she continues to have abdominal cramping in the lower abdomen. Patient reports the dicyclomine does help the cramping. Patient continues to have diarrhea. Patient has tried imodium in the past which hasn't worked well for her. Patient denies any melena or hematochezia. Patient reports she has some epigastric burning. Patient does report nausea and some poor appetite on occasion. Patients weight is up. Patient reports increased belching. Patient denies reflux and dysphagia. Patient reports she does take ibuprofen frequently.     Additional data reviewed:  Colonoscopy 6/30/2021 -diverticulosis, tortuous colon, terminal ileum.  Benign small bowel biopsies and colon biopsies. Recall 5 years.     Objective   Vital Signs:   /86   Pulse 71   Temp 98 °F (36.7 °C)   Ht 157.5 cm (62\")   Wt 64.3 kg (141 lb 12.8 oz)   BMI 25.94 kg/m²       Physical Exam  Vitals reviewed.   Constitutional:       General: She is not in acute distress.     Appearance: Normal appearance. She is not ill-appearing.   HENT:      Head: Normocephalic and atraumatic.      Nose: Nose normal.      Mouth/Throat:      Pharynx: Oropharynx is clear.   Eyes:      Extraocular Movements: Extraocular movements intact.      Conjunctiva/sclera: Conjunctivae normal.      Pupils: Pupils are equal, round, and reactive to light.   Pulmonary:      Effort: Pulmonary effort is normal.   Abdominal:      General: There is no distension.      Palpations: Abdomen is soft. There is no mass.      Tenderness: There is abdominal tenderness in the epigastric area. There is no guarding or rebound.   Musculoskeletal: "         General: No swelling. Normal range of motion.      Cervical back: Normal range of motion.   Skin:     General: Skin is warm and dry.      Findings: No bruising or rash.   Neurological:      General: No focal deficit present.      Mental Status: She is alert and oriented to person, place, and time.      Motor: No weakness.      Gait: Gait normal.   Psychiatric:         Mood and Affect: Mood normal.          Result Review :   The following data was reviewed by: Vale Hackett PA-C on 05/15/2024:  CBC          9/27/2023    11:01   CBC   WBC 5.42    RBC 3.77    Hemoglobin 13.0    Hematocrit 37.5    MCV 99.5    MCH 34.5    MCHC 34.7    RDW 12.6    Platelets 259            Assessment and Plan    Diagnoses and all orders for this visit:    1. Irritable bowel syndrome with diarrhea (Primary)  -     riFAXIMin (Xifaxan) 550 MG tablet; Take 1 tablet by mouth Every 8 (Eight) Hours for 14 days.  Dispense: 42 tablet; Refill: 0    2. Epigastric discomfort  -     Case Request; Standing  -     Implement Anesthesia orders day of procedure.; Standing  -     Case Request    3. Dyspepsia  -     Case Request; Standing  -     Implement Anesthesia orders day of procedure.; Standing  -     Case Request       Recommend trial of Xifaxan for IBS-D  Continue dicyclomine as needed for IBS  Recommend proceeding with EGD in the setting of the above-mentioned symptoms and frequent NSAID use.  Risks (including, but not limited to perforation, bleeding, sedation-related complications, and death), benefits, and alternatives to the procedure were discussed with the patient and all questions were answered. Patient is agreeable to plan of care.     Follow Up   Return for EGD.    Dragon dictation used throughout this note.            Vale Garcia PA-C   [facility] ([facility]  [address] [place] [address]  Office: [phone]

## 2024-05-16 ENCOUNTER — SPECIALTY PHARMACY (OUTPATIENT)
Dept: GASTROENTEROLOGY | Facility: CLINIC | Age: 54
End: 2024-05-16
Payer: COMMERCIAL

## 2024-05-16 NOTE — PROGRESS NOTES
Xifaxan PA approved  The request has been approved. The authorization is effective for a maximum of 3 fills from 05/16/2024 to 05/16/2025, as long as the member is enrolled in their current health plan. A written notification letter will follow with additional details.. Authorization Expiration Date: May 16, 2025.     Mary Galicia  Specialty Pharmacy Technician

## 2024-05-23 ENCOUNTER — APPOINTMENT (OUTPATIENT)
Dept: WOMENS IMAGING | Facility: HOSPITAL | Age: 54
End: 2024-05-23
Payer: COMMERCIAL

## 2024-05-23 PROCEDURE — 77063 BREAST TOMOSYNTHESIS BI: CPT | Performed by: RADIOLOGY

## 2024-05-23 PROCEDURE — 77067 SCR MAMMO BI INCL CAD: CPT | Performed by: RADIOLOGY

## 2024-06-04 DIAGNOSIS — K58.0 IRRITABLE BOWEL SYNDROME WITH DIARRHEA: ICD-10-CM

## 2024-06-04 RX ORDER — RIFAXIMIN 550 MG/1
TABLET ORAL
Qty: 42 TABLET | Refills: 0 | OUTPATIENT
Start: 2024-06-04

## 2024-07-15 ENCOUNTER — LAB REQUISITION (OUTPATIENT)
Dept: LAB | Facility: HOSPITAL | Age: 54
End: 2024-07-15
Payer: COMMERCIAL

## 2024-07-15 ENCOUNTER — OUTSIDE FACILITY SERVICE (OUTPATIENT)
Dept: GASTROENTEROLOGY | Facility: CLINIC | Age: 54
End: 2024-07-15
Payer: COMMERCIAL

## 2024-07-15 DIAGNOSIS — R10.13 DYSPEPSIA: ICD-10-CM

## 2024-07-15 PROCEDURE — 88342 IMHCHEM/IMCYTCHM 1ST ANTB: CPT | Performed by: INTERNAL MEDICINE

## 2024-07-15 PROCEDURE — 88305 TISSUE EXAM BY PATHOLOGIST: CPT | Performed by: INTERNAL MEDICINE

## 2024-07-17 LAB
LAB AP CASE REPORT: NORMAL
PATH REPORT.ADDENDUM SPEC: NORMAL
PATH REPORT.FINAL DX SPEC: NORMAL
PATH REPORT.GROSS SPEC: NORMAL

## 2024-08-08 ENCOUNTER — TELEPHONE (OUTPATIENT)
Dept: GASTROENTEROLOGY | Facility: CLINIC | Age: 54
End: 2024-08-08
Payer: COMMERCIAL

## 2024-08-08 RX ORDER — PANTOPRAZOLE SODIUM 40 MG/1
40 TABLET, DELAYED RELEASE ORAL DAILY
Qty: 30 TABLET | Refills: 5 | Status: SHIPPED | OUTPATIENT
Start: 2024-08-08

## 2024-08-08 NOTE — TELEPHONE ENCOUNTER
----- Message from Darryl Fowler sent at 7/28/2024  3:01 PM EDT -----  Regarding: Biopsy results  Okay to call results, recommend initiate PPI.  ----- Message -----  From: Lab, Background User  Sent: 7/16/2024   2:26 PM EDT  To: Darryl LARSON MD

## 2024-08-08 NOTE — TELEPHONE ENCOUNTER
Pt called and per path report advised that her sm bx was unremarkable. The stomach bx showed chronic active gastritis and focal surface erosion. It was neg for h pylori. The ge junction bx was unremarkable. ADvised of Dr Fowler's recommendations. Pt verb understanding and would like to try pantoprazole 40mg po daily #30 with 5 refills  Script escirbed to pt's [facility].

## 2024-09-24 ENCOUNTER — OFFICE VISIT (OUTPATIENT)
Dept: FAMILY MEDICINE CLINIC | Facility: CLINIC | Age: 54
End: 2024-09-24
Payer: COMMERCIAL

## 2024-09-24 VITALS
HEIGHT: 62 IN | BODY MASS INDEX: 24.84 KG/M2 | DIASTOLIC BLOOD PRESSURE: 82 MMHG | SYSTOLIC BLOOD PRESSURE: 120 MMHG | HEART RATE: 85 BPM | OXYGEN SATURATION: 99 % | WEIGHT: 135 LBS

## 2024-09-24 DIAGNOSIS — Z00.00 ANNUAL PHYSICAL EXAM: Primary | ICD-10-CM

## 2024-09-24 DIAGNOSIS — Z13.220 SCREENING FOR LIPOID DISORDERS: ICD-10-CM

## 2024-09-24 DIAGNOSIS — I10 ESSENTIAL HYPERTENSION: ICD-10-CM

## 2024-09-24 DIAGNOSIS — Z13.29 SCREENING FOR THYROID DISORDER: ICD-10-CM

## 2024-09-24 PROCEDURE — 1126F AMNT PAIN NOTED NONE PRSNT: CPT | Performed by: NURSE PRACTITIONER

## 2024-09-24 PROCEDURE — 3079F DIAST BP 80-89 MM HG: CPT | Performed by: NURSE PRACTITIONER

## 2024-09-24 PROCEDURE — 99396 PREV VISIT EST AGE 40-64: CPT | Performed by: NURSE PRACTITIONER

## 2024-09-24 PROCEDURE — 3074F SYST BP LT 130 MM HG: CPT | Performed by: NURSE PRACTITIONER

## 2024-09-24 RX ORDER — LOSARTAN POTASSIUM 100 MG/1
100 TABLET ORAL DAILY
Qty: 90 TABLET | Refills: 1 | OUTPATIENT
Start: 2024-09-24

## 2024-09-24 RX ORDER — LOSARTAN POTASSIUM 100 MG/1
100 TABLET ORAL DAILY
Qty: 90 TABLET | Refills: 3 | Status: SHIPPED | OUTPATIENT
Start: 2024-09-24

## 2024-09-25 LAB
ALBUMIN SERPL-MCNC: 4.7 G/DL (ref 3.5–5.2)
ALBUMIN/GLOB SERPL: 2.2 G/DL
ALP SERPL-CCNC: 79 U/L (ref 39–117)
ALT SERPL-CCNC: 30 U/L (ref 1–33)
AST SERPL-CCNC: 34 U/L (ref 1–32)
BASOPHILS # BLD AUTO: 0.04 10*3/MM3 (ref 0–0.2)
BASOPHILS NFR BLD AUTO: 0.9 % (ref 0–1.5)
BILIRUB SERPL-MCNC: 0.2 MG/DL (ref 0–1.2)
BUN SERPL-MCNC: 7 MG/DL (ref 6–20)
BUN/CREAT SERPL: 10.4 (ref 7–25)
CALCIUM SERPL-MCNC: 9.3 MG/DL (ref 8.6–10.5)
CHLORIDE SERPL-SCNC: 99 MMOL/L (ref 98–107)
CHOLEST SERPL-MCNC: 246 MG/DL (ref 0–200)
CO2 SERPL-SCNC: 23 MMOL/L (ref 22–29)
CREAT SERPL-MCNC: 0.67 MG/DL (ref 0.57–1)
EGFRCR SERPLBLD CKD-EPI 2021: 105.3 ML/MIN/1.73
EOSINOPHIL # BLD AUTO: 0.06 10*3/MM3 (ref 0–0.4)
EOSINOPHIL NFR BLD AUTO: 1.4 % (ref 0.3–6.2)
ERYTHROCYTE [DISTWIDTH] IN BLOOD BY AUTOMATED COUNT: 14.1 % (ref 12.3–15.4)
GLOBULIN SER CALC-MCNC: 2.1 GM/DL
GLUCOSE SERPL-MCNC: 86 MG/DL (ref 65–99)
HBA1C MFR BLD: 5.1 % (ref 4.8–5.6)
HCT VFR BLD AUTO: 38.4 % (ref 34–46.6)
HDLC SERPL-MCNC: 137 MG/DL (ref 40–60)
HGB BLD-MCNC: 13.1 G/DL (ref 12–15.9)
IMM GRANULOCYTES # BLD AUTO: 0.02 10*3/MM3 (ref 0–0.05)
IMM GRANULOCYTES NFR BLD AUTO: 0.5 % (ref 0–0.5)
LDLC SERPL CALC-MCNC: 75 MG/DL (ref 0–100)
LDLC/HDLC SERPL: 0.48 {RATIO}
LYMPHOCYTES # BLD AUTO: 1.37 10*3/MM3 (ref 0.7–3.1)
LYMPHOCYTES NFR BLD AUTO: 31.3 % (ref 19.6–45.3)
MCH RBC QN AUTO: 35 PG (ref 26.6–33)
MCHC RBC AUTO-ENTMCNC: 34.1 G/DL (ref 31.5–35.7)
MCV RBC AUTO: 102.7 FL (ref 79–97)
MONOCYTES # BLD AUTO: 0.45 10*3/MM3 (ref 0.1–0.9)
MONOCYTES NFR BLD AUTO: 10.3 % (ref 5–12)
NEUTROPHILS # BLD AUTO: 2.44 10*3/MM3 (ref 1.7–7)
NEUTROPHILS NFR BLD AUTO: 55.6 % (ref 42.7–76)
NRBC BLD AUTO-RTO: 0 /100 WBC (ref 0–0.2)
PLATELET # BLD AUTO: 214 10*3/MM3 (ref 140–450)
POTASSIUM SERPL-SCNC: 4.4 MMOL/L (ref 3.5–5.2)
PROT SERPL-MCNC: 6.8 G/DL (ref 6–8.5)
RBC # BLD AUTO: 3.74 10*6/MM3 (ref 3.77–5.28)
SODIUM SERPL-SCNC: 138 MMOL/L (ref 136–145)
TRIGL SERPL-MCNC: 219 MG/DL (ref 0–150)
TSH SERPL DL<=0.005 MIU/L-ACNC: 0.89 UIU/ML (ref 0.27–4.2)
VLDLC SERPL CALC-MCNC: 34 MG/DL (ref 5–40)
WBC # BLD AUTO: 4.38 10*3/MM3 (ref 3.4–10.8)

## 2024-11-05 ENCOUNTER — OFFICE VISIT (OUTPATIENT)
Dept: FAMILY MEDICINE CLINIC | Facility: CLINIC | Age: 54
End: 2024-11-05
Payer: COMMERCIAL

## 2024-11-05 VITALS
HEART RATE: 70 BPM | OXYGEN SATURATION: 98 % | DIASTOLIC BLOOD PRESSURE: 82 MMHG | TEMPERATURE: 98.8 F | WEIGHT: 133.8 LBS | BODY MASS INDEX: 24.62 KG/M2 | HEIGHT: 62 IN | RESPIRATION RATE: 16 BRPM | SYSTOLIC BLOOD PRESSURE: 132 MMHG

## 2024-11-05 DIAGNOSIS — J01.00 ACUTE MAXILLARY SINUSITIS, RECURRENCE NOT SPECIFIED: Primary | ICD-10-CM

## 2024-11-05 DIAGNOSIS — R05.1 ACUTE COUGH: ICD-10-CM

## 2024-11-05 PROCEDURE — 3079F DIAST BP 80-89 MM HG: CPT | Performed by: FAMILY MEDICINE

## 2024-11-05 PROCEDURE — 3075F SYST BP GE 130 - 139MM HG: CPT | Performed by: FAMILY MEDICINE

## 2024-11-05 PROCEDURE — 1126F AMNT PAIN NOTED NONE PRSNT: CPT | Performed by: FAMILY MEDICINE

## 2024-11-05 PROCEDURE — 99214 OFFICE O/P EST MOD 30 MIN: CPT | Performed by: FAMILY MEDICINE

## 2024-11-05 RX ORDER — PREDNISONE 20 MG/1
20 TABLET ORAL 2 TIMES DAILY
Qty: 10 TABLET | Refills: 0 | Status: SHIPPED | OUTPATIENT
Start: 2024-11-05

## 2024-11-05 RX ORDER — AZELASTINE 1 MG/ML
2 SPRAY, METERED NASAL 2 TIMES DAILY
Qty: 30 ML | Refills: 3 | Status: SHIPPED | OUTPATIENT
Start: 2024-11-05

## 2024-11-05 RX ORDER — CODEINE PHOSPHATE/GUAIFENESIN 10-100MG/5
5 LIQUID (ML) ORAL 3 TIMES DAILY PRN
Qty: 118 ML | Refills: 0 | Status: SHIPPED | OUTPATIENT
Start: 2024-11-05

## 2024-11-12 DIAGNOSIS — J01.00 ACUTE MAXILLARY SINUSITIS, RECURRENCE NOT SPECIFIED: ICD-10-CM

## 2024-11-12 RX ORDER — CODEINE PHOSPHATE AND GUAIFENESIN 10; 100 MG/5ML; MG/5ML
SOLUTION ORAL
Qty: 118 ML | OUTPATIENT
Start: 2024-11-12

## 2024-11-17 NOTE — PROGRESS NOTES
"Chief Complaint  Nasal Congestion (Thinks allergies), URI (Thinks infection. Almost hurts), and Sore Throat (2 weeks)    Subjective        Emily Tierney presents to [facility]  URI   Associated symptoms include a sore throat.   Sore Throat         History of Present Illness  The patient is a 53-year-old female who presents for evaluation of a cough.    She has been experiencing a cough with unpleasant-tasting phlegm for the past 2 weeks. She reports no fever but does have sinus pressure. She mentions a sore throat due to the persistent coughing and a sensation of blocked ears and sore lymph nodes. She reports no ear pain, body aches, or exposure to COVID-19. 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    Her current medications include Flonase, Allegra, and Mucinex. She is also on medication for cholesterol and blood pressure. She has not taken any steroids in the past. She recalls that DayQuil once caused her blood pressure to rise significantly. She has previously been prescribed Z-Glen.    She has no known drug allergies.    SOCIAL HISTORY  She has never smoked in her life.    ALLERGIES  She is not allergic to any medication.     Objective   Vital Signs:  /82 (BP Location: Left arm, Patient Position: Sitting, Cuff Size: Adult)   Pulse 70   Temp 98.8 °F (37.1 °C) (Oral)   Resp 16   Ht 157.5 cm (62.01\")   Wt 60.7 kg (133 lb 12.8 oz)   SpO2 98%   BMI 24.46 kg/m²   Estimated body mass index is 24.46 kg/m² as calculated from the following:    Height as of this encounter: 157.5 cm (62.01\").    Weight as of this encounter: 60.7 kg (133 lb 12.8 oz).       BMI is within normal parameters. No other follow-up for BMI required.      Physical Exam  Constitutional:       General: She is not in acute distress.     Appearance: Normal appearance. She is well-developed.   HENT:      Head: Normocephalic and atraumatic.      Right Ear: " Tympanic membrane normal.      Left Ear: Tympanic membrane normal.      Nose: Mucosal edema present.      Right Turbinates: Swollen.      Left Turbinates: Swollen.      Right Sinus: Maxillary sinus tenderness present.      Left Sinus: Maxillary sinus tenderness present.      Mouth/Throat:      Lips: Pink.      Mouth: Mucous membranes are moist.      Pharynx: Oropharynx is clear.      Comments: Positive postnasal drainage  Eyes:      General:         Right eye: No discharge.         Left eye: No discharge.      Extraocular Movements: Extraocular movements intact.      Pupils: Pupils are equal, round, and reactive to light.   Cardiovascular:      Rate and Rhythm: Normal rate and regular rhythm.      Pulses: Normal pulses.      Heart sounds: Normal heart sounds.   Pulmonary:      Effort: Pulmonary effort is normal.      Breath sounds: Normal breath sounds. No wheezing or rales.   Abdominal:      General: Bowel sounds are normal.      Palpations: Abdomen is soft. There is no mass.      Tenderness: There is no abdominal tenderness.   Musculoskeletal:      Cervical back: Normal range of motion and neck supple.      Right lower leg: No edema.      Left lower leg: No edema.   Lymphadenopathy:      Cervical: No cervical adenopathy.   Neurological:      General: No focal deficit present.      Mental Status: She is alert and oriented to person, place, and time.        Result Review :                   Assessment and Plan   Diagnoses and all orders for this visit:    1. Acute maxillary sinusitis, recurrence not specified (Primary)  -     amoxicillin-clavulanate (AUGMENTIN) 875-125 MG per tablet; Take 1 tablet by mouth 2 (Two) Times a Day.  Dispense: 20 tablet; Refill: 0  -     azelastine (ASTELIN) 0.1 % nasal spray; Administer 2 sprays into the nostril(s) as directed by provider 2 (Two) Times a Day.  Dispense: 30 mL; Refill: 3  -     predniSONE (DELTASONE) 20 MG tablet; Take 1 tablet by mouth 2 (Two) Times a Day.  Dispense: 10  tablet; Refill: 0  -     guaiFENesin-codeine (GUAIFENESIN AC) 100-10 MG/5ML liquid; Take 5 mL by mouth 3 (Three) Times a Day As Needed for Cough or Congestion.  Dispense: 118 mL; Refill: 0    2. Acute cough        Assessment & Plan  1.  Acute maxillary sinusitis and cough  The patient has been experiencing a persistent cough for 2 weeks, accompanied by bad-tasting phlegm and a sore throat for the last 2 days. She has been using Flonase, Allegra, and Mucinex without significant relief. There is no fever, ear pain, or body aches, and she has not been exposed to anyone with Covid. Examination revealed fluid in one ear and blocked nasal passages. Given the duration and symptoms, an antibiotic regimen is necessary. A prescription for Augmentin has been provided for 10 days. Additionally, Astelin nasal spray has been prescribed to aid in drainage, to be used twice daily along with Flonase. A cough syrup containing codeine has also been prescribed to be taken at night due to its potential to induce drowsiness. Prednisone has been prescribed to reduce inflammation and help with symptoms.              Follow Up   There are no Patient Instructions on file for this visit.   No follow-ups on file.  Patient was given instructions and counseling regarding her condition or for health maintenance advice. Please see specific information pulled into the AVS if appropriate.     Patient or patient representative verbalized consent for the use of Ambient Listening during the visit with  Akanksha Conde MD for chart documentation. 11/17/2024  14:57 EST

## 2024-11-26 DIAGNOSIS — J01.00 ACUTE MAXILLARY SINUSITIS, RECURRENCE NOT SPECIFIED: ICD-10-CM

## 2024-11-26 RX ORDER — PREDNISONE 20 MG/1
20 TABLET ORAL 2 TIMES DAILY
Qty: 10 TABLET | Refills: 0 | OUTPATIENT
Start: 2024-11-26

## 2024-11-26 NOTE — TELEPHONE ENCOUNTER
Rx Refill Note  Requested Prescriptions     Pending Prescriptions Disp Refills    predniSONE (DELTASONE) 20 MG tablet 10 tablet 0     Sig: Take 1 tablet by mouth 2 (Two) Times a Day.    amoxicillin-clavulanate (AUGMENTIN) 875-125 MG per tablet 20 tablet 0     Sig: Take 1 tablet by mouth 2 (Two) Times a Day.      Last office visit with prescribing clinician: 9/24/2024   Last telemedicine visit with prescribing clinician: Visit date not found   Next office visit with prescribing clinician: 9/30/2025                         Would you like a call back once the refill request has been completed: [] Yes [] No    If the office needs to give you a call back, can they leave a voicemail: [] Yes [] No    Matias Santiago MA  11/26/24, 13:27 EST

## 2024-11-26 NOTE — TELEPHONE ENCOUNTER
Caller: Emily Tierney    Relationship: Self    Best call back number: [phone]     Requested Prescriptions:   Requested Prescriptions     Pending Prescriptions Disp Refills    predniSONE (DELTASONE) 20 MG tablet 10 tablet 0     Sig: Take 1 tablet by mouth 2 (Two) Times a Day.    amoxicillin-clavulanate (AUGMENTIN) 875-125 MG per tablet 20 tablet 0     Sig: Take 1 tablet by mouth 2 (Two) Times a Day.        Pharmacy where request should be sent: [id number] [place], [place] - [address] [facility] AT [facility] [phone] [facility] [phone]      Last office visit with prescribing clinician: 9/24/2024   Last telemedicine visit with prescribing clinician: Visit date not found   Next office visit with prescribing clinician: 9/30/2025     Additional details provided by patient: PATIENT FINISHED MEDICATION AND NOW THIS WEEK IS SICK WITH THE SAME THING AGAIN AND WANTS TO KNOW IF SHE CAN DO ANOTHER ROUND TO HELP ALSO ASKING FOR COUGH MEDICATION ALSO     Does the patient have less than a 3 day supply:  [x] Yes  [] No    Would you like a call back once the refill request has been completed: [x] Yes [] No    If the office needs to give you a call back, can they leave a voicemail: [x] Yes [] No    Puneet Phan Rep   11/26/24 13:19 EST

## 2024-11-27 ENCOUNTER — OFFICE VISIT (OUTPATIENT)
Dept: FAMILY MEDICINE CLINIC | Facility: CLINIC | Age: 54
End: 2024-11-27
Payer: COMMERCIAL

## 2024-11-27 VITALS
RESPIRATION RATE: 14 BRPM | SYSTOLIC BLOOD PRESSURE: 153 MMHG | WEIGHT: 134 LBS | OXYGEN SATURATION: 96 % | BODY MASS INDEX: 24.66 KG/M2 | HEIGHT: 62 IN | TEMPERATURE: 97.5 F | HEART RATE: 83 BPM | DIASTOLIC BLOOD PRESSURE: 90 MMHG

## 2024-11-27 DIAGNOSIS — J06.9 UPPER RESPIRATORY TRACT INFECTION, UNSPECIFIED TYPE: Primary | ICD-10-CM

## 2024-11-27 PROCEDURE — 1126F AMNT PAIN NOTED NONE PRSNT: CPT | Performed by: FAMILY MEDICINE

## 2024-11-27 PROCEDURE — 87428 SARSCOV & INF VIR A&B AG IA: CPT | Performed by: FAMILY MEDICINE

## 2024-11-27 PROCEDURE — 3080F DIAST BP >= 90 MM HG: CPT | Performed by: FAMILY MEDICINE

## 2024-11-27 PROCEDURE — 99213 OFFICE O/P EST LOW 20 MIN: CPT | Performed by: FAMILY MEDICINE

## 2024-11-27 PROCEDURE — 3077F SYST BP >= 140 MM HG: CPT | Performed by: FAMILY MEDICINE

## 2024-11-27 RX ORDER — PREDNISONE 20 MG/1
TABLET ORAL
Qty: 15 TABLET | Refills: 0 | Status: SHIPPED | OUTPATIENT
Start: 2024-11-27 | End: 2024-12-07

## 2024-11-27 RX ORDER — AZITHROMYCIN 250 MG/1
TABLET, FILM COATED ORAL
Qty: 6 TABLET | Refills: 0 | Status: SHIPPED | OUTPATIENT
Start: 2024-11-27

## 2024-11-27 NOTE — PROGRESS NOTES
"Chief Complaint  URI and Cough (2 weeks went away after cough med and abx)    Subjective        Emily Tierney presents to [facility]  History of Present Illness    2 to 3 weeks of URI symptoms.  Was seen here by different provider.  Prescribed Augmentin for 10 days and steroids for 5 days.  Patient states the steroids helped greatly.  She is having ongoing sinus congestion with purulent drainage and cough with green-colored mucus.  No shortness of breath.  No fever.  No history of asthma.  She is using fluticasone nasal spray.  She has been very busy at work.    Objective   Vital Signs:  /90   Pulse 83   Temp 97.5 °F (36.4 °C) (Infrared)   Resp 14   Ht 157.5 cm (62\")   Wt 60.8 kg (134 lb)   SpO2 96%   BMI 24.51 kg/m²   Estimated body mass index is 24.51 kg/m² as calculated from the following:    Height as of this encounter: 157.5 cm (62\").    Weight as of this encounter: 60.8 kg (134 lb).    BMI is within normal parameters. No other follow-up for BMI required.      Physical Exam  Vitals and nursing note reviewed.   Constitutional:       General: She is not in acute distress.     Comments: No acute distress.  Nontoxic.   HENT:      Right Ear: Tympanic membrane, ear canal and external ear normal.      Left Ear: Tympanic membrane, ear canal and external ear normal.      Nose: Congestion present.      Mouth/Throat:      Pharynx: Posterior oropharyngeal erythema present. No oropharyngeal exudate.   Eyes:      General: No scleral icterus.        Right eye: No discharge.         Left eye: No discharge.      Conjunctiva/sclera: Conjunctivae normal.   Cardiovascular:      Rate and Rhythm: Normal rate.   Pulmonary:      Effort: Pulmonary effort is normal. No respiratory distress.      Breath sounds: Normal breath sounds. No stridor. No wheezing or rales.   Lymphadenopathy:      Cervical: No cervical adenopathy.   Skin:     Findings: No rash.        Result Review :           "      Assessment and Plan   Diagnoses and all orders for this visit:    1. Upper respiratory tract infection, unspecified type (Primary)  -     POCT SARS-CoV-2 Antigen ANGELICA + Flu    Other orders  -     predniSONE (DELTASONE) 20 MG tablet; Take 2 tablets by mouth Daily for 5 days, THEN 1 tablet Daily for 5 days.  Dispense: 15 tablet; Refill: 0  -     azithromycin (Zithromax Z-Glen) 250 MG tablet; Take 2 tablets the first day, then 1 tablet daily for 4 days.  Dispense: 6 tablet; Refill: 0    URI syndrome.  Postviral inflammation versus ongoing bacterial sinusitis.  Azithromycin 5-day pack.  Prednisone taper 10 days.  Side effects discussed.  With severe symptoms seek medical attention.  Flu test and COVID test negative today.  Follow-up with her PCP as needed.         Follow Up   No follow-ups on file.  Patient was given instructions and counseling regarding her condition or for health maintenance advice. Please see specific information pulled into the AVS if appropriate.

## 2025-01-30 RX ORDER — PANTOPRAZOLE SODIUM 40 MG/1
40 TABLET, DELAYED RELEASE ORAL DAILY
Qty: 30 TABLET | Refills: 2 | Status: SHIPPED | OUTPATIENT
Start: 2025-01-30

## 2025-03-31 ENCOUNTER — OFFICE VISIT (OUTPATIENT)
Dept: OBSTETRICS AND GYNECOLOGY | Age: 55
End: 2025-03-31
Payer: COMMERCIAL

## 2025-03-31 VITALS
DIASTOLIC BLOOD PRESSURE: 76 MMHG | SYSTOLIC BLOOD PRESSURE: 124 MMHG | BODY MASS INDEX: 24.29 KG/M2 | WEIGHT: 132 LBS | HEIGHT: 62 IN

## 2025-03-31 DIAGNOSIS — N95.1 PERIMENOPAUSAL SYMPTOMS: ICD-10-CM

## 2025-03-31 DIAGNOSIS — Z12.4 SCREENING FOR MALIGNANT NEOPLASM OF THE CERVIX: ICD-10-CM

## 2025-03-31 DIAGNOSIS — Z12.31 BREAST CANCER SCREENING BY MAMMOGRAM: ICD-10-CM

## 2025-03-31 DIAGNOSIS — Z01.419 ENCOUNTER FOR GYNECOLOGICAL EXAMINATION WITHOUT ABNORMAL FINDING: Primary | ICD-10-CM

## 2025-03-31 DIAGNOSIS — Z11.51 SPECIAL SCREENING EXAMINATION FOR HUMAN PAPILLOMAVIRUS (HPV): ICD-10-CM

## 2025-03-31 PROCEDURE — 1160F RVW MEDS BY RX/DR IN RCRD: CPT | Performed by: OBSTETRICS & GYNECOLOGY

## 2025-03-31 PROCEDURE — 99396 PREV VISIT EST AGE 40-64: CPT | Performed by: OBSTETRICS & GYNECOLOGY

## 2025-03-31 PROCEDURE — 3074F SYST BP LT 130 MM HG: CPT | Performed by: OBSTETRICS & GYNECOLOGY

## 2025-03-31 PROCEDURE — 3078F DIAST BP <80 MM HG: CPT | Performed by: OBSTETRICS & GYNECOLOGY

## 2025-03-31 PROCEDURE — 1159F MED LIST DOCD IN RCRD: CPT | Performed by: OBSTETRICS & GYNECOLOGY

## 2025-03-31 NOTE — PROGRESS NOTES
"Subjective   Emily Tierney is a 54 y.o. female presents for annual visit today , last pap 3/6/24   neg , mmg 5/23/24 benign  @[facility] , needs an order for [facility] for this year colonoscopy 6/30/21 due 2026 ,contraception - vasectomy , periods are irregular , had one in December and one in February for 5 days , increase in hot flashes past 6 months does not want to treat with anything .  Her son has decided to go to [facility].  Advised patient to read the book, the new menopause.  I last saw her a year ago for annual visit.  Her son is a april this year and doing great at [employer].  She is also working part-time at [employer] and loves it.  He plans to either go to [facility] or  for [facility]   Gynecologic Exam  The patient's pertinent negatives include no pelvic pain or vaginal discharge. Pertinent negatives include no chills or fever.       The following portions of the patient's history were reviewed and updated as appropriate: allergies, current medications, past family history, past medical history, past social history, past surgical history, and problem list.    Review of Systems   Constitutional:  Negative for chills, fatigue and fever.   Endocrine: Positive for heat intolerance.   Genitourinary:  Negative for pelvic pain, vaginal bleeding, vaginal discharge and vaginal pain.   All other systems reviewed and are negative.    /76   Ht 157.5 cm (62\")   Wt 59.9 kg (132 lb)   LMP 02/01/2025 (Exact Date)   BMI 24.14 kg/m²     Objective   Physical Exam  Vitals and nursing note reviewed.   Constitutional:       Appearance: Normal appearance. She is well-developed and normal weight.   Neck:      Thyroid: No thyromegaly.   Pulmonary:      Effort: Pulmonary effort is normal.   Chest:   Breasts:     Right: No mass, nipple discharge, skin change or tenderness.      Left: No mass, nipple discharge, skin change or tenderness.   Abdominal:      General: There is no distension.      Palpations: Abdomen is " soft.      Tenderness: There is no abdominal tenderness.   Genitourinary:     General: Normal vulva.      Exam position: Lithotomy position.      Labia:         Right: No rash or lesion.         Left: No rash or lesion.       Vagina: Normal. No vaginal discharge or bleeding.      Cervix: No friability or lesion.      Uterus: Not enlarged and not tender.       Adnexa:         Right: No mass or tenderness.          Left: No mass or tenderness.     Musculoskeletal:         General: Normal range of motion.      Cervical back: Normal range of motion.   Skin:     General: Skin is warm and dry.      Findings: No rash.   Neurological:      Mental Status: She is alert and oriented to person, place, and time.   Psychiatric:         Mood and Affect: Mood normal.         Behavior: Behavior normal.           Assessment & Plan   Diagnoses and all orders for this visit:    1. Encounter for gynecological examination without abnormal finding (Primary)    2. Perimenopausal symptoms    3. Breast cancer screening by mammogram  -     Mammo Screening Digital Tomosynthesis Bilateral With CAD; Future      Counseling was given to patient for the following topics: instructions for management, risks and benefits of treatment options, importance of treatment compliance, and self-breast exams  .   Return in about 1 year (around 3/31/2026) for Annual physical.

## 2025-04-03 LAB
CYTOLOGIST CVX/VAG CYTO: NORMAL
CYTOLOGY CVX/VAG DOC CYTO: NORMAL
CYTOLOGY CVX/VAG DOC THIN PREP: NORMAL
DX ICD CODE: NORMAL
HPV I/H RISK 4 DNA CVX QL PROBE+SIG AMP: NEGATIVE
OTHER STN SPEC: NORMAL
SERVICE CMNT-IMP: NORMAL
STAT OF ADQ CVX/VAG CYTO-IMP: NORMAL

## 2025-05-05 RX ORDER — PANTOPRAZOLE SODIUM 40 MG/1
40 TABLET, DELAYED RELEASE ORAL DAILY
Qty: 30 TABLET | Refills: 2 | Status: SHIPPED | OUTPATIENT
Start: 2025-05-05

## 2025-05-13 ENCOUNTER — TELEPHONE (OUTPATIENT)
Dept: GASTROENTEROLOGY | Facility: CLINIC | Age: 55
End: 2025-05-13
Payer: COMMERCIAL

## 2025-05-27 ENCOUNTER — HOSPITAL ENCOUNTER (OUTPATIENT)
Facility: HOSPITAL | Age: 55
Discharge: HOME | End: 2025-05-27
Admitting: OBSTETRICS & GYNECOLOGY
Payer: COMMERCIAL

## 2025-05-27 DIAGNOSIS — Z12.31 BREAST CANCER SCREENING BY MAMMOGRAM: ICD-10-CM

## 2025-05-27 PROCEDURE — 77067 SCR MAMMO BI INCL CAD: CPT

## 2025-05-27 PROCEDURE — 77063 BREAST TOMOSYNTHESIS BI: CPT

## 2025-07-07 RX ORDER — ATORVASTATIN CALCIUM 40 MG/1
40 TABLET, FILM COATED ORAL DAILY
Qty: 30 TABLET | Refills: 1 | Status: SHIPPED | OUTPATIENT
Start: 2025-07-07

## 2025-07-08 RX ORDER — DICYCLOMINE HCL 20 MG
TABLET ORAL
Qty: 90 TABLET | Refills: 5 | Status: SHIPPED | OUTPATIENT
Start: 2025-07-08

## 2025-08-29 RX ORDER — ATORVASTATIN CALCIUM 40 MG/1
40 TABLET, FILM COATED ORAL DAILY
Qty: 30 TABLET | Refills: 1 | OUTPATIENT
Start: 2025-08-29

## (undated) DEVICE — CANN O2 ETCO2 FITS ALL CONN CO2 SMPL A/ 7IN DISP LF

## (undated) DEVICE — THE TORRENT IRRIGATION SCOPE CONNECTOR IS USED WITH THE TORRENT IRRIGATION TUBING TO PROVIDE IRRIGATION FLUIDS SUCH AS STERILE WATER DURING GASTROINTESTINAL ENDOSCOPIC PROCEDURES WHEN USED IN CONJUNCTION WITH AN IRRIGATION PUMP (OR ELECTROSURGICAL UNIT).: Brand: TORRENT

## (undated) DEVICE — SINGLE-USE BIOPSY FORCEPS: Brand: RADIAL JAW 4

## (undated) DEVICE — TUBING, SUCTION, 1/4" X 10', STRAIGHT: Brand: MEDLINE

## (undated) DEVICE — KT ORCA ORCAPOD DISP STRL

## (undated) DEVICE — Device: Brand: DEFENDO AIR/WATER/SUCTION AND BIOPSY VALVE

## (undated) DEVICE — SENSR O2 OXIMAX FNGR A/ 18IN NONSTR

## (undated) DEVICE — CANNULA,ADULT,SOFT-TOUCH,7TUBE,SC: Brand: MEDLINE

## (undated) DEVICE — LN SMPL CO2 SHTRM SD STREAM W/M LUER

## (undated) DEVICE — ADAPT CLN BIOGUARD AIR/H2O DISP